# Patient Record
Sex: MALE | Race: WHITE | NOT HISPANIC OR LATINO | Employment: OTHER | URBAN - METROPOLITAN AREA
[De-identification: names, ages, dates, MRNs, and addresses within clinical notes are randomized per-mention and may not be internally consistent; named-entity substitution may affect disease eponyms.]

---

## 2017-05-08 PROBLEM — E11.29 CONTROLLED TYPE 2 DIABETES MELLITUS WITH MICROALBUMINURIA, WITHOUT LONG-TERM CURRENT USE OF INSULIN: Status: ACTIVE | Noted: 2017-05-08

## 2017-05-08 PROBLEM — R80.9 CONTROLLED TYPE 2 DIABETES MELLITUS WITH MICROALBUMINURIA, WITHOUT LONG-TERM CURRENT USE OF INSULIN: Status: ACTIVE | Noted: 2017-05-08

## 2017-09-11 PROBLEM — E78.2 MIXED HYPERLIPIDEMIA: Status: ACTIVE | Noted: 2017-09-11

## 2017-09-11 PROBLEM — E03.8 SUBCLINICAL HYPOTHYROIDISM: Status: ACTIVE | Noted: 2017-09-11

## 2017-09-27 ENCOUNTER — TELEPHONE (OUTPATIENT)
Dept: ADMINISTRATIVE | Facility: HOSPITAL | Age: 67
End: 2017-09-27

## 2017-10-03 PROBLEM — E11.9 TYPE 2 DIABETES MELLITUS WITHOUT OPHTHALMIC MANIFESTATIONS: Status: ACTIVE | Noted: 2017-10-03

## 2017-10-03 PROBLEM — H25.13 NUCLEAR SCLEROSIS, BILATERAL: Status: ACTIVE | Noted: 2017-10-03

## 2017-11-30 PROBLEM — Z86.010 HX OF COLONIC POLYPS: Status: ACTIVE | Noted: 2017-11-30

## 2018-09-11 PROBLEM — K45.8 RECURRENT ABDOMINAL HERNIA WITHOUT OBSTRUCTION OR GANGRENE: Status: ACTIVE | Noted: 2018-09-11

## 2018-10-11 ENCOUNTER — TELEPHONE (OUTPATIENT)
Dept: ADMINISTRATIVE | Facility: HOSPITAL | Age: 68
End: 2018-10-11

## 2019-02-19 ENCOUNTER — TELEPHONE (OUTPATIENT)
Dept: SMOKING CESSATION | Facility: CLINIC | Age: 69
End: 2019-02-19

## 2019-04-01 PROBLEM — R41.82 ALTERED MENTAL STATUS: Status: ACTIVE | Noted: 2019-04-01

## 2019-04-02 PROBLEM — J18.9 PNEUMONIA DUE TO INFECTIOUS ORGANISM: Status: ACTIVE | Noted: 2019-04-02

## 2019-04-02 PROBLEM — S12.110A: Status: ACTIVE | Noted: 2019-04-02

## 2019-04-03 ENCOUNTER — HOSPITAL ENCOUNTER (INPATIENT)
Facility: HOSPITAL | Age: 69
LOS: 2 days | Discharge: PSYCHIATRIC OR SUBSTANCE USE FACILITY | DRG: 885 | End: 2019-04-05
Attending: NEUROLOGICAL SURGERY | Admitting: NEUROLOGICAL SURGERY
Payer: MEDICARE

## 2019-04-03 DIAGNOSIS — J43.9 PULMONARY EMPHYSEMA, UNSPECIFIED EMPHYSEMA TYPE: ICD-10-CM

## 2019-04-03 DIAGNOSIS — Z91.89 AT RISK FOR PROLONGED QT INTERVAL SYNDROME: ICD-10-CM

## 2019-04-03 DIAGNOSIS — F29 PSYCHOSIS: ICD-10-CM

## 2019-04-03 DIAGNOSIS — J43.1 PANLOBULAR EMPHYSEMA: ICD-10-CM

## 2019-04-03 DIAGNOSIS — S12.112A CLOSED NONDISPLACED ODONTOID FRACTURE WITH TYPE II MORPHOLOGY, INITIAL ENCOUNTER: ICD-10-CM

## 2019-04-03 DIAGNOSIS — I10 ESSENTIAL HYPERTENSION: ICD-10-CM

## 2019-04-03 DIAGNOSIS — R80.9 CONTROLLED TYPE 2 DIABETES MELLITUS WITH MICROALBUMINURIA, WITHOUT LONG-TERM CURRENT USE OF INSULIN: ICD-10-CM

## 2019-04-03 DIAGNOSIS — E11.9 TYPE 2 DIABETES MELLITUS WITHOUT COMPLICATION, WITHOUT LONG-TERM CURRENT USE OF INSULIN: ICD-10-CM

## 2019-04-03 DIAGNOSIS — Z72.0 TOBACCO ABUSE: ICD-10-CM

## 2019-04-03 DIAGNOSIS — E11.29 CONTROLLED TYPE 2 DIABETES MELLITUS WITH MICROALBUMINURIA, WITHOUT LONG-TERM CURRENT USE OF INSULIN: ICD-10-CM

## 2019-04-03 DIAGNOSIS — S12.110A: ICD-10-CM

## 2019-04-03 DIAGNOSIS — F24 SHARED PSYCHOTIC DISORDER: ICD-10-CM

## 2019-04-03 LAB
APTT BLDCRRT: 27.7 SEC (ref 21–32)
BACTERIA #/AREA URNS AUTO: ABNORMAL /HPF
BILIRUB UR QL STRIP: NEGATIVE
CLARITY UR REFRACT.AUTO: CLEAR
COLOR UR AUTO: YELLOW
GLUCOSE UR QL STRIP: NEGATIVE
HGB UR QL STRIP: ABNORMAL
INR PPP: 0.9 (ref 0.8–1.2)
KETONES UR QL STRIP: NEGATIVE
LEUKOCYTE ESTERASE UR QL STRIP: NEGATIVE
MICROSCOPIC COMMENT: ABNORMAL
NITRITE UR QL STRIP: NEGATIVE
PH UR STRIP: >8 [PH] (ref 5–8)
POCT GLUCOSE: 134 MG/DL (ref 70–110)
POCT GLUCOSE: 196 MG/DL (ref 70–110)
POCT GLUCOSE: 223 MG/DL (ref 70–110)
PROT UR QL STRIP: NEGATIVE
PROTHROMBIN TIME: 9.6 SEC (ref 9–12.5)
RBC #/AREA URNS AUTO: 5 /HPF (ref 0–4)
SP GR UR STRIP: 1.01 (ref 1–1.03)
SQUAMOUS #/AREA URNS AUTO: 0 /HPF
URN SPEC COLLECT METH UR: ABNORMAL
WBC #/AREA URNS AUTO: 1 /HPF (ref 0–5)

## 2019-04-03 PROCEDURE — 20600001 HC STEP DOWN PRIVATE ROOM

## 2019-04-03 PROCEDURE — S4991 NICOTINE PATCH NONLEGEND: HCPCS | Performed by: PHYSICIAN ASSISTANT

## 2019-04-03 PROCEDURE — 85610 PROTHROMBIN TIME: CPT

## 2019-04-03 PROCEDURE — 85730 THROMBOPLASTIN TIME PARTIAL: CPT

## 2019-04-03 PROCEDURE — 99222 1ST HOSP IP/OBS MODERATE 55: CPT | Mod: ,,, | Performed by: PSYCHIATRY & NEUROLOGY

## 2019-04-03 PROCEDURE — 36415 COLL VENOUS BLD VENIPUNCTURE: CPT

## 2019-04-03 PROCEDURE — 94799 UNLISTED PULMONARY SVC/PX: CPT

## 2019-04-03 PROCEDURE — 63600175 PHARM REV CODE 636 W HCPCS: Performed by: PHYSICIAN ASSISTANT

## 2019-04-03 PROCEDURE — 25000003 PHARM REV CODE 250: Performed by: PSYCHIATRY & NEUROLOGY

## 2019-04-03 PROCEDURE — 25000003 PHARM REV CODE 250: Performed by: PHYSICIAN ASSISTANT

## 2019-04-03 PROCEDURE — 81001 URINALYSIS AUTO W/SCOPE: CPT

## 2019-04-03 PROCEDURE — 99222 PR INITIAL HOSPITAL CARE,LEVL II: ICD-10-PCS | Mod: ,,, | Performed by: PSYCHIATRY & NEUROLOGY

## 2019-04-03 RX ORDER — QUINAPRIL 40 MG/1
40 TABLET ORAL DAILY
Status: DISCONTINUED | OUTPATIENT
Start: 2019-04-03 | End: 2019-04-05 | Stop reason: HOSPADM

## 2019-04-03 RX ORDER — AMLODIPINE BESYLATE 10 MG/1
10 TABLET ORAL DAILY
Status: DISCONTINUED | OUTPATIENT
Start: 2019-04-03 | End: 2019-04-05 | Stop reason: HOSPADM

## 2019-04-03 RX ORDER — IBUPROFEN 200 MG
16 TABLET ORAL
Status: DISCONTINUED | OUTPATIENT
Start: 2019-04-03 | End: 2019-04-05 | Stop reason: HOSPADM

## 2019-04-03 RX ORDER — RISPERIDONE 1 MG/1
1 TABLET ORAL 2 TIMES DAILY
Status: DISCONTINUED | OUTPATIENT
Start: 2019-04-03 | End: 2019-04-04

## 2019-04-03 RX ORDER — AMITRIPTYLINE HYDROCHLORIDE 25 MG/1
25 TABLET, FILM COATED ORAL NIGHTLY PRN
Status: DISCONTINUED | OUTPATIENT
Start: 2019-04-03 | End: 2019-04-05 | Stop reason: HOSPADM

## 2019-04-03 RX ORDER — AMOXICILLIN 250 MG
1 CAPSULE ORAL 2 TIMES DAILY
Status: DISCONTINUED | OUTPATIENT
Start: 2019-04-03 | End: 2019-04-05 | Stop reason: HOSPADM

## 2019-04-03 RX ORDER — OLANZAPINE 2.5 MG/1
5 TABLET ORAL EVERY 6 HOURS PRN
Status: DISCONTINUED | OUTPATIENT
Start: 2019-04-03 | End: 2019-04-05 | Stop reason: HOSPADM

## 2019-04-03 RX ORDER — AMITRIPTYLINE HYDROCHLORIDE 50 MG/1
100 TABLET, FILM COATED ORAL NIGHTLY PRN
Status: DISCONTINUED | OUTPATIENT
Start: 2019-04-03 | End: 2019-04-03

## 2019-04-03 RX ORDER — INSULIN ASPART 100 [IU]/ML
1-10 INJECTION, SOLUTION INTRAVENOUS; SUBCUTANEOUS
Status: DISCONTINUED | OUTPATIENT
Start: 2019-04-03 | End: 2019-04-05 | Stop reason: HOSPADM

## 2019-04-03 RX ORDER — IBUPROFEN 200 MG
1 TABLET ORAL DAILY
Status: DISCONTINUED | OUTPATIENT
Start: 2019-04-03 | End: 2019-04-05 | Stop reason: HOSPADM

## 2019-04-03 RX ORDER — ATORVASTATIN CALCIUM 20 MG/1
40 TABLET, FILM COATED ORAL DAILY
Status: DISCONTINUED | OUTPATIENT
Start: 2019-04-03 | End: 2019-04-05 | Stop reason: HOSPADM

## 2019-04-03 RX ORDER — OLANZAPINE 10 MG/2ML
5 INJECTION, POWDER, FOR SOLUTION INTRAMUSCULAR EVERY 6 HOURS PRN
Status: DISCONTINUED | OUTPATIENT
Start: 2019-04-03 | End: 2019-04-05 | Stop reason: HOSPADM

## 2019-04-03 RX ORDER — CYCLOBENZAPRINE HCL 5 MG
10 TABLET ORAL 3 TIMES DAILY PRN
Status: DISCONTINUED | OUTPATIENT
Start: 2019-04-03 | End: 2019-04-03

## 2019-04-03 RX ORDER — HYDROCODONE BITARTRATE AND ACETAMINOPHEN 10; 325 MG/1; MG/1
1 TABLET ORAL EVERY 4 HOURS PRN
Status: DISCONTINUED | OUTPATIENT
Start: 2019-04-03 | End: 2019-04-03

## 2019-04-03 RX ORDER — IBUPROFEN 200 MG
24 TABLET ORAL
Status: DISCONTINUED | OUTPATIENT
Start: 2019-04-03 | End: 2019-04-05 | Stop reason: HOSPADM

## 2019-04-03 RX ORDER — DIVALPROEX SODIUM 250 MG/1
250 TABLET, DELAYED RELEASE ORAL 2 TIMES DAILY
Status: DISCONTINUED | OUTPATIENT
Start: 2019-04-03 | End: 2019-04-05 | Stop reason: HOSPADM

## 2019-04-03 RX ORDER — GLUCAGON 1 MG
1 KIT INJECTION
Status: DISCONTINUED | OUTPATIENT
Start: 2019-04-03 | End: 2019-04-05 | Stop reason: HOSPADM

## 2019-04-03 RX ORDER — POLYETHYLENE GLYCOL 3350 17 G/17G
17 POWDER, FOR SOLUTION ORAL DAILY
Status: DISCONTINUED | OUTPATIENT
Start: 2019-04-03 | End: 2019-04-05 | Stop reason: HOSPADM

## 2019-04-03 RX ORDER — AMITRIPTYLINE HYDROCHLORIDE 25 MG/1
25 TABLET, FILM COATED ORAL NIGHTLY
Status: DISCONTINUED | OUTPATIENT
Start: 2019-04-03 | End: 2019-04-04

## 2019-04-03 RX ORDER — HYDROCODONE BITARTRATE AND ACETAMINOPHEN 5; 325 MG/1; MG/1
1 TABLET ORAL EVERY 6 HOURS PRN
Status: DISCONTINUED | OUTPATIENT
Start: 2019-04-03 | End: 2019-04-05 | Stop reason: HOSPADM

## 2019-04-03 RX ORDER — ACETAMINOPHEN 325 MG/1
650 TABLET ORAL EVERY 6 HOURS PRN
Status: DISCONTINUED | OUTPATIENT
Start: 2019-04-03 | End: 2019-04-05 | Stop reason: HOSPADM

## 2019-04-03 RX ORDER — BUPROPION HYDROCHLORIDE 300 MG/1
300 TABLET ORAL DAILY
Status: DISCONTINUED | OUTPATIENT
Start: 2019-04-03 | End: 2019-04-03

## 2019-04-03 RX ORDER — CYCLOBENZAPRINE HCL 5 MG
5 TABLET ORAL 3 TIMES DAILY PRN
Status: DISCONTINUED | OUTPATIENT
Start: 2019-04-03 | End: 2019-04-05 | Stop reason: HOSPADM

## 2019-04-03 RX ORDER — ALBUTEROL SULFATE 90 UG/1
2 AEROSOL, METERED RESPIRATORY (INHALATION) EVERY 6 HOURS PRN
Status: DISCONTINUED | OUTPATIENT
Start: 2019-04-03 | End: 2019-04-05 | Stop reason: HOSPADM

## 2019-04-03 RX ORDER — CARVEDILOL 25 MG/1
25 TABLET ORAL 2 TIMES DAILY
Status: DISCONTINUED | OUTPATIENT
Start: 2019-04-03 | End: 2019-04-05 | Stop reason: HOSPADM

## 2019-04-03 RX ADMIN — NICOTINE 1 PATCH: 21 PATCH, EXTENDED RELEASE TRANSDERMAL at 11:04

## 2019-04-03 RX ADMIN — STANDARDIZED SENNA CONCENTRATE AND DOCUSATE SODIUM 1 TABLET: 8.6; 5 TABLET, FILM COATED ORAL at 09:04

## 2019-04-03 RX ADMIN — STANDARDIZED SENNA CONCENTRATE AND DOCUSATE SODIUM 1 TABLET: 8.6; 5 TABLET, FILM COATED ORAL at 11:04

## 2019-04-03 RX ADMIN — POLYETHYLENE GLYCOL 3350 17 G: 17 POWDER, FOR SOLUTION ORAL at 11:04

## 2019-04-03 RX ADMIN — CYCLOBENZAPRINE HYDROCHLORIDE 5 MG: 5 TABLET, FILM COATED ORAL at 09:04

## 2019-04-03 RX ADMIN — DIVALPROEX SODIUM 250 MG: 250 TABLET, DELAYED RELEASE ORAL at 11:04

## 2019-04-03 RX ADMIN — QUINAPRIL 40 MG: 40 TABLET ORAL at 01:04

## 2019-04-03 RX ADMIN — RISPERIDONE 1 MG: 1 TABLET ORAL at 11:04

## 2019-04-03 RX ADMIN — HYDROCODONE BITARTRATE AND ACETAMINOPHEN 1 TABLET: 5; 325 TABLET ORAL at 11:04

## 2019-04-03 RX ADMIN — HYDROCODONE BITARTRATE AND ACETAMINOPHEN 1 TABLET: 5; 325 TABLET ORAL at 09:04

## 2019-04-03 RX ADMIN — AMITRIPTYLINE HYDROCHLORIDE 25 MG: 25 TABLET, FILM COATED ORAL at 09:04

## 2019-04-03 RX ADMIN — CARVEDILOL 25 MG: 25 TABLET, FILM COATED ORAL at 11:04

## 2019-04-03 RX ADMIN — ATORVASTATIN CALCIUM 40 MG: 20 TABLET, FILM COATED ORAL at 11:04

## 2019-04-03 RX ADMIN — BUPROPION HYDROCHLORIDE 300 MG: 300 TABLET, FILM COATED, EXTENDED RELEASE ORAL at 11:04

## 2019-04-03 RX ADMIN — RISPERIDONE 1 MG: 1 TABLET ORAL at 09:04

## 2019-04-03 RX ADMIN — CARVEDILOL 25 MG: 25 TABLET, FILM COATED ORAL at 09:04

## 2019-04-03 RX ADMIN — AMLODIPINE BESYLATE 10 MG: 10 TABLET ORAL at 11:04

## 2019-04-03 RX ADMIN — INSULIN ASPART 4 UNITS: 100 INJECTION, SOLUTION INTRAVENOUS; SUBCUTANEOUS at 05:04

## 2019-04-03 RX ADMIN — DIVALPROEX SODIUM 250 MG: 250 TABLET, DELAYED RELEASE ORAL at 09:04
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
## 2019-04-03 NOTE — ASSESSMENT & PLAN NOTE
69 year old male with a PMHx of BCC, depression, T2DM, GERD, HTN, and HLD, who originally presented to the [facility] ED due to altered mental status. While in the ED, he complained of acute onset of neck pain. CT cervical spine showed a nondisplaced type 2 dens fracture.     -Patient neurologically stable on exam  -Vista collar ordered from [facility]  -Collar to be worn at all times  -Psychiatry consulted to determine if PEC needs to be placed and to evaluate medications  -Will hold  mg in case surgical intervention is required  -Agitation/Hallucinations: Psyc at [facility] hospital recommended starting Risperdal 1 mg BID. Will start this medication. Also recommended decreasing Elavil to 25 mg qHS PRN.   -Depression: Restart home dose of Bupropion.   -HTN: Restart home dose of Carvedilol, Amlodipine, and Quinapril.   -T2DM: Diabetic diet, ISS, POCT x 4  -HLD: Restart home dose of Atorvastatin  -Emphysema: Restart home dose of Albuterol PRN wheezing  -Tobacco abuse: Nicotine patch daily  -DVT prophylaxis: MAIDA's, SCD's  -Bowel regimen: senna and miralax daily  -Atelectasis prevention: IS hourly while awake  -Further treatment recs pending psyc eval

## 2019-04-03 NOTE — SUBJECTIVE & OBJECTIVE
Medications:  Continuous Infusions:  Scheduled Meds:   amLODIPine  10 mg Oral Daily    atorvastatin  40 mg Oral Daily    buPROPion  300 mg Oral Daily    carvedilol  25 mg Oral BID    divalproex  250 mg Oral BID    nicotine  1 patch Transdermal Daily    polyethylene glycol  17 g Oral Daily    quinapril  40 mg Oral Daily    risperiDONE  1 mg Oral BID    senna-docusate 8.6-50 mg  1 tablet Oral BID     PRN Meds:acetaminophen, albuterol, amitriptyline, cyclobenzaprine, dextrose 50%, dextrose 50%, glucagon (human recombinant), glucose, glucose, glucose, HYDROcodone-acetaminophen, insulin aspart U-100     Review of Systems   Constitutional: no fever, chills or night sweats. No changes in weight   Eyes: no visual changes   ENT: no nasal congestion or sore throat   Respiratory: Positive for intermittent shortness of breath   Cardiovascular: no chest pain or palpitations   Gastrointestinal: no nausea or vomiting   Genitourinary: no hematuria or dysuria   Integument/Breast: no rash or pruritis   Hematologic/Lymphatic: no easy bruising or lymphadenopathy   Musculoskeletal: Positive for neck pain  Neurological: Positive for intermittent headaches and intermittently dropping items from hands.   Behavioral/Psych: no auditory or visual hallucinations   Endocrine: no heat or cold intolerance     Objective:        There is no height or weight on file to calculate BMI.  Vital Signs (Most Recent):  Temp: 98.7 °F (37.1 °C) (04/03/19 1049)  Pulse: 85 (04/03/19 1049)  Resp: 16 (04/03/19 1049)  BP: (!) 143/85 (04/03/19 1049)  SpO2: 96 % (04/03/19 1049) Vital Signs (24h Range):  Temp:  [96.2 °F (35.7 °C)-98.7 °F (37.1 °C)] 98.7 °F (37.1 °C)  Pulse:  [81-99] 85  Resp:  [16-20] 16  SpO2:  [96 %-98 %] 96 %  BP: (119-157)/(76-98) 143/85       Neurosurgery Physical Exam   General: well developed, well nourished, appears older than stated age, poor hygiene, no distress.   Head: normocephalic, small scalp hematoma to right temporal  area  Neurologic: Alert, awake, interactive. Tangential speech.   GCS: Motor: 6/Verbal: 4/Eyes: 4 GCS Total: 14  Language: No aphasia  Speech: No dysarthria  Cranial nerves: face symmetric, tongue midline, CN II-XII grossly intact.   Eyes: pupils equal, round, reactive to light with accomodation, EOMI.   Pulmonary: normal respirations, no signs of respiratory distress  Abdomen: soft, non-distended, not tender to palpation  Skin: Skin is warm, dry and intact.  Sensory: intact to light touch throughout  Motor Strength: Moves all extremities spontaneously with good tone.  Full strength upper and lower extremities. No abnormal movements seen.   Squires's: Negative.  Clonus: Negative.  Finger-to-nose normal.   Pronator drift: absent bilaterally  Midline TTP: Negative.         Significant Labs:  Recent Labs   Lab 04/01/19  1638 04/02/19  0506 04/03/19  0509    87 97   * 134* 134*   K 4.2 3.2* 3.6   CL 93* 98 97   CO2 27 26 27   BUN 20 12 8   CREATININE 0.8 0.6 0.6   CALCIUM 10.4 9.1 9.3   MG  --  1.4* 1.8     Recent Labs   Lab 04/01/19  1638 04/02/19  0506 04/03/19  0509   WBC 17.94* 13.81* 10.14   HGB 15.2 13.4* 13.4*   HCT 45.2 40.1 39.6*    271 258     Recent Labs   Lab 04/02/19  1015   INR 1.2         Significant Diagnostics:  CT cervical spine:  I independently reviewed the imaging.     Nondisplaced fracture base of dens (type 2 dens fracture) with well-marginated rounded area of lucency at the posterior aspect base of dens and as such possibility of pathologic fracture versus traumatic fracture is raised.  Prominence of adjacent prevertebral soft tissues noted.    No other acute fracture appreciated remaining cervical levels.    Changes of anterior interbody fusion C3-4.    Disc and/or facet degenerative changes are present throughout the cervical spine as detailed for each level above.

## 2019-04-03 NOTE — ASSESSMENT & PLAN NOTE
ASSESSMENT     Conrad Wilcox is a 69 y.o. male with a past psychiatric history of depression, who presented to the [facility] due to cervical fracture.    On psychiatric interview, patient is initially linear, but often goes into tangents and his thought content is bizarre (I.e., getting into an altercation with his cat due to cat pushing his Norco away from him, VH of a black screen and someone dressed in green). His speech is rapid and garbled but he is redirectable. He denies SI and HI but endorses AH (which last told him to stay calm) and the aforementioned VH. His only previous psych dx is depression, but patient currently does not appear objectively depressed nor does he report any SIGECAPS sx other than low energy level. UTox + for opiates and PCP, so PCP induced psychosis high on differential. Collateral is essential to obtaining an idea of patient's baseline, as well as background, and will continue to attempt to obtain collateral.    IMPRESSION  Psychosis, unspecified  R/o Substance Induced Psychosis (likely 2/2 PCP)    RECOMMENDATION(S)      1. Scheduled Medication(s):  Risperdal 1mg PO BID  Depakote 250mg PO BID  Elavil 25mg PO QHS   (discontinue due to dopaminergic effects)     2. PRN Medication(s):  Zyprexa 5mg PO/IM Q6H PRN for non-redirectable agitation    3.  Monitor:  Please obtain daily EKG to monitor QTc    4. Legal Status/Precaution(s):  Recommend PEC/CEC as patient is gravely disabled due to a psychiatric illness.  Recommend sitter at bedside, as patient was attempting to stand up after interview, and he is a fall risk.    5. Capacity:  Pt continues to have waxing and waning of symptoms. Therefore pt currently does NOT have medical decision making capacity due to current mental status. Please contact next of kin for making medical decisions currently.
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
## 2019-04-04 LAB
ANION GAP SERPL CALC-SCNC: 9 MMOL/L (ref 8–16)
BASOPHILS # BLD AUTO: 0.04 K/UL (ref 0–0.2)
BASOPHILS NFR BLD: 0.5 % (ref 0–1.9)
BUN SERPL-MCNC: 13 MG/DL (ref 8–23)
CALCIUM SERPL-MCNC: 8.5 MG/DL (ref 8.7–10.5)
CHLORIDE SERPL-SCNC: 98 MMOL/L (ref 95–110)
CO2 SERPL-SCNC: 26 MMOL/L (ref 23–29)
CREAT SERPL-MCNC: 0.6 MG/DL (ref 0.5–1.4)
DIFFERENTIAL METHOD: ABNORMAL
EOSINOPHIL # BLD AUTO: 0.2 K/UL (ref 0–0.5)
EOSINOPHIL NFR BLD: 1.9 % (ref 0–8)
ERYTHROCYTE [DISTWIDTH] IN BLOOD BY AUTOMATED COUNT: 15.7 % (ref 11.5–14.5)
EST. GFR  (AFRICAN AMERICAN): >60 ML/MIN/1.73 M^2
EST. GFR  (NON AFRICAN AMERICAN): >60 ML/MIN/1.73 M^2
GLUCOSE SERPL-MCNC: 99 MG/DL (ref 70–110)
HCT VFR BLD AUTO: 33.9 % (ref 40–54)
HGB BLD-MCNC: 11.5 G/DL (ref 14–18)
IMM GRANULOCYTES # BLD AUTO: 0.02 K/UL (ref 0–0.04)
IMM GRANULOCYTES NFR BLD AUTO: 0.3 % (ref 0–0.5)
LYMPHOCYTES # BLD AUTO: 1.9 K/UL (ref 1–4.8)
LYMPHOCYTES NFR BLD: 23.8 % (ref 18–48)
MCH RBC QN AUTO: 29.5 PG (ref 27–31)
MCHC RBC AUTO-ENTMCNC: 33.9 G/DL (ref 32–36)
MCV RBC AUTO: 87 FL (ref 82–98)
MONOCYTES # BLD AUTO: 1.2 K/UL (ref 0.3–1)
MONOCYTES NFR BLD: 15.6 % (ref 4–15)
NEUTROPHILS # BLD AUTO: 4.5 K/UL (ref 1.8–7.7)
NEUTROPHILS NFR BLD: 57.9 % (ref 38–73)
NRBC BLD-RTO: 0 /100 WBC
PLATELET # BLD AUTO: 286 K/UL (ref 150–350)
PMV BLD AUTO: 9.5 FL (ref 9.2–12.9)
POCT GLUCOSE: 105 MG/DL (ref 70–110)
POCT GLUCOSE: 115 MG/DL (ref 70–110)
POCT GLUCOSE: 147 MG/DL (ref 70–110)
POTASSIUM SERPL-SCNC: 3.9 MMOL/L (ref 3.5–5.1)
RBC # BLD AUTO: 3.9 M/UL (ref 4.6–6.2)
SODIUM SERPL-SCNC: 133 MMOL/L (ref 136–145)
WBC # BLD AUTO: 7.82 K/UL (ref 3.9–12.7)

## 2019-04-04 PROCEDURE — 25000003 PHARM REV CODE 250: Performed by: PSYCHIATRY & NEUROLOGY

## 2019-04-04 PROCEDURE — 99232 PR SUBSEQUENT HOSPITAL CARE,LEVL II: ICD-10-PCS | Mod: ,,, | Performed by: PHYSICIAN ASSISTANT

## 2019-04-04 PROCEDURE — 99232 PR SUBSEQUENT HOSPITAL CARE,LEVL II: ICD-10-PCS | Mod: ,,, | Performed by: PSYCHIATRY & NEUROLOGY

## 2019-04-04 PROCEDURE — 93005 ELECTROCARDIOGRAM TRACING: CPT

## 2019-04-04 PROCEDURE — 93010 ELECTROCARDIOGRAM REPORT: CPT | Mod: ,,, | Performed by: INTERNAL MEDICINE

## 2019-04-04 PROCEDURE — 25000003 PHARM REV CODE 250: Performed by: PHYSICIAN ASSISTANT

## 2019-04-04 PROCEDURE — 99232 SBSQ HOSP IP/OBS MODERATE 35: CPT | Mod: ,,, | Performed by: PHYSICIAN ASSISTANT

## 2019-04-04 PROCEDURE — 36415 COLL VENOUS BLD VENIPUNCTURE: CPT

## 2019-04-04 PROCEDURE — 20600001 HC STEP DOWN PRIVATE ROOM

## 2019-04-04 PROCEDURE — S4991 NICOTINE PATCH NONLEGEND: HCPCS | Performed by: PHYSICIAN ASSISTANT

## 2019-04-04 PROCEDURE — 85025 COMPLETE CBC W/AUTO DIFF WBC: CPT

## 2019-04-04 PROCEDURE — 93010 EKG 12-LEAD: ICD-10-PCS | Mod: ,,, | Performed by: INTERNAL MEDICINE

## 2019-04-04 PROCEDURE — 99232 SBSQ HOSP IP/OBS MODERATE 35: CPT | Mod: ,,, | Performed by: PSYCHIATRY & NEUROLOGY

## 2019-04-04 PROCEDURE — 80048 BASIC METABOLIC PNL TOTAL CA: CPT

## 2019-04-04 RX ORDER — RISPERIDONE 1 MG/1
2 TABLET ORAL NIGHTLY
Status: DISCONTINUED | OUTPATIENT
Start: 2019-04-04 | End: 2019-04-05 | Stop reason: HOSPADM

## 2019-04-04 RX ORDER — RISPERIDONE 1 MG/1
1 TABLET ORAL DAILY
Status: DISCONTINUED | OUTPATIENT
Start: 2019-04-05 | End: 2019-04-05 | Stop reason: HOSPADM

## 2019-04-04 RX ADMIN — RISPERIDONE 1 MG: 1 TABLET ORAL at 08:04

## 2019-04-04 RX ADMIN — CYCLOBENZAPRINE HYDROCHLORIDE 5 MG: 5 TABLET, FILM COATED ORAL at 08:04

## 2019-04-04 RX ADMIN — CARVEDILOL 25 MG: 25 TABLET, FILM COATED ORAL at 08:04

## 2019-04-04 RX ADMIN — ATORVASTATIN CALCIUM 40 MG: 20 TABLET, FILM COATED ORAL at 08:04

## 2019-04-04 RX ADMIN — HYDROCODONE BITARTRATE AND ACETAMINOPHEN 1 TABLET: 5; 325 TABLET ORAL at 06:04

## 2019-04-04 RX ADMIN — POLYETHYLENE GLYCOL 3350 17 G: 17 POWDER, FOR SOLUTION ORAL at 08:04

## 2019-04-04 RX ADMIN — STANDARDIZED SENNA CONCENTRATE AND DOCUSATE SODIUM 1 TABLET: 8.6; 5 TABLET, FILM COATED ORAL at 08:04

## 2019-04-04 RX ADMIN — SODIUM CHLORIDE TAB 1 GM 1 G: 1 TAB at 08:04

## 2019-04-04 RX ADMIN — DIVALPROEX SODIUM 250 MG: 250 TABLET, DELAYED RELEASE ORAL at 08:04

## 2019-04-04 RX ADMIN — NICOTINE 1 PATCH: 21 PATCH, EXTENDED RELEASE TRANSDERMAL at 08:04

## 2019-04-04 RX ADMIN — RISPERIDONE 2 MG: 1 TABLET ORAL at 08:04

## 2019-04-04 RX ADMIN — HYDROCODONE BITARTRATE AND ACETAMINOPHEN 1 TABLET: 5; 325 TABLET ORAL at 09:04

## 2019-04-04 RX ADMIN — SODIUM CHLORIDE TAB 1 GM 1 G: 1 TAB at 09:04

## 2019-04-04 RX ADMIN — QUINAPRIL 40 MG: 40 TABLET ORAL at 08:04

## 2019-04-04 RX ADMIN — HYDROCODONE BITARTRATE AND ACETAMINOPHEN 1 TABLET: 5; 325 TABLET ORAL at 02:04
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
## 2019-04-04 NOTE — PLAN OF CARE
CM met with patient to obtain discharge planning assessment.  Patient has sitter at bedside as patient is PEC'D.  Patient able to state name, address and states who his roommate is.  Patient has garbled speech and does not appear to be clear in thought.  Planned discharge is home with family - Plan (A) or home with family and home health - Plan (B).    PCP:  Jerman Franco MD     Payor:  Payor: MEDICARE / Plan: MEDICARE PART A & B / Product Type: Government /      Pharmacy:    [facility]  [place], [place] - [address]  [address] [place] [address]  Phone: [phone] Fax: [phone]    [facility] [address] [facility] [place]  [address] AT [facility] [address]  [address]  [place] [address]  Phone: [phone] Fax: [phone]       04/04/19 1423   Discharge Assessment   Assessment Type Discharge Planning Assessment   Confirmed/corrected address and phone number on facesheet? Yes   Assessment information obtained from? Patient   Communicated expected length of stay with patient/caregiver no   Prior to hospitilization cognitive status: Unable to Assess   Prior to hospitalization functional status: Independent   Current cognitive status: Not Oriented to Place;Not Oriented to Time   Current Functional Status: Needs Assistance   Facility [facility]rived From: [facility]   Lives With friend(s)   Able to Return to Prior Arrangements other (see comments)  (unknown at this time.)   Is patient able to care for self after discharge? Unable to determine at this time (comments)   Who are your caregiver(s) and their phone number(s)? Parminder Walker - friend [phone]   Patient's perception of discharge disposition home or selfcare   Readmission Within the Last 30 Days no previous admission in last 30 days   Patient currently being followed by outpatient case management? No   Patient currently receives any other outside agency services? No   Equipment Currently Used at Home cane,  straight   Do you have any problems affording any of your prescribed medications? TBD   Does the patient have transportation home? Yes   Does the patient receive services at the [facility]? No   Discharge Plan A Home with family   Discharge Plan B Home with family;Home Health   DME Needed Upon Discharge  none   Patient/Family in Agreement with Plan unable to assess

## 2019-04-04 NOTE — HOSPITAL COURSE
4/3: Admit to NSGY service for surgical planning. Psychiatry consulted to determine if PEC necessary and for medication adjustments.   4/4: Patient PEC'ed yesterday. Psychiatric medications adjusted. Surgical intervention placed on hold due to patient's inability to consent for surgery.   4/5: NAEON. Pain controlled. Vitals stable. Active PEC. Surgical intervention on hold. DC to [facility] inpatient Psychiatric unit.

## 2019-04-04 NOTE — ASSESSMENT & PLAN NOTE
ASSESSMENT     Conrad Wilcox is a 69 y.o. male with a past psychiatric history of depression, who [place] [facility] [place]. On initial psychiatric interview, patient is initially linear, but often goes into tangents and his thought content is bizarre (I.e., getting into an altercation with his cat due to cat pushing his Norco away from him, VH of a black screen and someone dressed in green). His speech is rapid and garbled but he is redirectable. He denies SI and HI but endorses AH (which last told him to stay calm) and the aforementioned VH. His only previous psych dx is depression, but patient currently does not appear objectively depressed nor does he report any SIGECAPS sx other than low energy level. UTox + for opiates and PCP, so PCP induced psychosis high on differential. Collateral is essential to obtaining an idea of patient's baseline, as well as background, and will continue to attempt to obtain collateral.    Today, patient's continues to demonstrate disorganized thought process. Interview concerning for residual paranoia and impaired insight. Tolerating psych meds without issues, and no psych PRNs required in past 24 hours. Team is still unable to reach collateral today.    IMPRESSION  Psychosis, unspecified  R/o Substance Induced Psychosis (likely 2/2 PCP)    RECOMMENDATION(S)      1. Scheduled Medication(s):  Increase Risperdal to 1mg qAM, 2mg qHS  Depakote 250mg PO BID   (discontinue on 4/4)   (discontinue due to dopaminergic effects)     2. PRN Medication(s):  Zyprexa 5mg PO/IM Q6H PRN for non-redirectable agitation    3.  Monitor:  Please obtain daily EKG to monitor QTc: most recent 437 on 4/4    4. Legal Status/Precaution(s):  Continue PEC (exp 4/6) as patient is gravely disabled due to a psychiatric illness.  Recommend sitter at bedside, as patient was attempting to stand up after initial interview, and he is a fall risk.    5. Capacity:  Pt continues to have waxing and  waning of symptoms. Therefore pt currently does NOT have medical decision making capacity due to current mental status. Please contact next of kin for making medical decisions currently.
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
## 2019-04-04 NOTE — ASSESSMENT & PLAN NOTE
69 year old male with a PMHx of BCC, depression, T2DM, GERD, HTN, and HLD, who originally presented to the [facility] ED due to altered mental status. While in the ED, he complained of acute onset of neck pain. CT cervical spine showed a nondisplaced type 2 dens fracture.     -Patient neurologically stable on exam  -Continue collar to be worn at all times  -Surgery on hold due to PEC. Continue to hold  mg in preparation for surgery in the future.    -Agitation/Hallucinations: Continue PEC and bedside sitter at all times. Continue Risperdal 1 mg qAM and 1 mg qHS. Continue Depakote 250 mg BID and Zyprexa PRN. At risk for prolonged QT interval. Continue daily EKG to monitor QTc. Patient medically stable, no current need for a medicine consult.  Would appreciate transfer to [facility] service for inpatient psychiatric care until PEC can be lifted and surgery can be performed.     -HTN: Continue home dose of Carvedilol, Amlodipine, and Quinapril.   -T2DM: Diabetic diet, ISS, POCT x 4  -HLD: Continue home dose of Atorvastatin  -Emphysema: Continue home dose of Albuterol PRN wheezing  -Tobacco abuse: Continue Nicotine patch daily  -DVT prophylaxis: MAIDA's, SCD's  -Bowel regimen: senna and miralax daily  -Atelectasis prevention: IS hourly while awake      DISPO: Unable to discharge due to active PEC

## 2019-04-04 NOTE — HOSPITAL COURSE
"  4/4/2019   Chart reviewed. Patient has been medication compliant. Received no psychiatric PRNs in the past 24 hours. Patient calm and cooperative with interview with persistent garbled speech and disorganized thought process. Mood is "fine but I don't know why I'm here". When asked about AH, he states that he has heard voices for years and he last heard them two days ago and they were "cheerful". When asked about VH, he goes into a tangent about trees he sees in the mirror (reflection of trees outside the window). Vague paranoia. Denies SI, HI. Reports sleeping and eating well, but the sitter at bedside reports that the patient did not sleep at all. No somatic complaints, no other complaints during interview. No side effects noted with Risperdal, Elavil, and Depakote.     4/5/2019   Chart reviewed. CEC'd yesterday at 10:05am. Patient has been medication compliant. Received no psychiatric PRNs in the past 24 hours. Patient calm and cooperative with interview. Speech is garbled and rapid. Remains disorganized, and describes his mood as "everyone is lighthearted around here". Denies SI and HI. Provides a vague response when asked about AH. When asked about VH, he reports seeing an "old puffy man with glasses" earlier and points at the corner of the room. Goes into a tangent about how the knot on his head makes his dyslexia worse. Reports not sleeping well last night, and sitter reports that patient likely slept only 15 minutes last night. C/o neck pain.   "

## 2019-04-04 NOTE — SUBJECTIVE & OBJECTIVE
Interval History:   NAEON. Patient resting comfortably in bed. Sitter at bedside. Patient continues to report neck pain. Difficult to understand other statements due to repetitive, tangential speech. Tolerating diet. Voiding appropriately.     Medications:  Continuous Infusions:  Scheduled Meds:   amLODIPine  10 mg Oral Daily    atorvastatin  40 mg Oral Daily    carvedilol  25 mg Oral BID    divalproex  250 mg Oral BID    nicotine  1 patch Transdermal Daily    polyethylene glycol  17 g Oral Daily    quinapril  40 mg Oral Daily    [START ON 4/5/2019] risperiDONE  1 mg Oral Daily    risperiDONE  2 mg Oral QHS    senna-docusate 8.6-50 mg  1 tablet Oral BID    sodium chloride  1 g Oral BID     PRN Meds:acetaminophen, albuterol, amitriptyline, cyclobenzaprine, dextrose 50%, dextrose 50%, glucagon (human recombinant), glucose, glucose, glucose, HYDROcodone-acetaminophen, insulin aspart U-100, OLANZapine, OLANZapine     Review of Systems  Objective:     Weight: 74.8 kg (165 lb)  Body mass index is 23.68 kg/m².  Vital Signs (Most Recent):  Temp: 97.6 °F (36.4 °C) (04/04/19 0755)  Pulse: 70 (04/04/19 1222)  Resp: 18 (04/04/19 1222)  BP: 105/61 (04/04/19 1222)  SpO2: 96 % (04/04/19 1222) Vital Signs (24h Range):  Temp:  [97.5 °F (36.4 °C)-98.5 °F (36.9 °C)] 97.6 °F (36.4 °C)  Pulse:  [64-77] 70  Resp:  [16-18] 18  SpO2:  [91 %-98 %] 96 %  BP: ()/(57-86) 105/61       Neurosurgery Physical Exam   General: well developed, well nourished, appears older than stated age, poor hygiene, no distress.   Head: normocephalic, small scalp hematoma to right temporal area  Neurologic: Alert, awake, interactive. Tangential speech.   GCS: Motor: 6/Verbal: 4/Eyes: 4 GCS Total: 14  Language: No aphasia  Speech: No dysarthria  Cranial nerves: face symmetric, tongue midline, CN II-XII grossly intact.   Eyes: pupils equal, round, reactive to light with accomodation, EOMI.   Pulmonary: normal respirations, no signs of respiratory  distress  Abdomen: soft, non-distended, not tender to palpation  Skin: Skin is warm, dry and intact.  Sensory: intact to light touch throughout  Motor Strength: Moves all extremities spontaneously with good tone.  Full strength upper and lower extremities. No abnormal movements seen.   Squires's: Negative.  Clonus: Negative.        Significant Labs:  Recent Labs   Lab 04/03/19  0509 04/04/19  0422   GLU 97 99   * 133*   K 3.6 3.9   CL 97 98   CO2 27 26   BUN 8 13   CREATININE 0.6 0.6   CALCIUM 9.3 8.5*   MG 1.8  --      Recent Labs   Lab 04/03/19  0509 04/04/19  0422   WBC 10.14 7.82   HGB 13.4* 11.5*   HCT 39.6* 33.9*    286     Recent Labs   Lab 04/03/19  1304   INR 0.9   APTT 27.7

## 2019-04-04 NOTE — PLAN OF CARE
Problem: Fall Injury Risk  Goal: Absence of Fall and Fall-Related Injury    Intervention: Promote Injury-Free Environment  Pt VSS throughout shift. Pt had intermittent periods of orientation with slurred muffled speech. Pt c/o neck pain and lower back pain, treated with hydrocodone with moderate relief. At 0300 pt seemed agitated and removed c-collar while trying to reach belongings. Pt belongings moved to nurses station due to PEC. Pt calmed verbally. CARLOS Tejeda is present at bedside with pt. Pt remained free from falls. Bed in lowest position, call bell within reach, pt had no further needs at this time.
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
## 2019-04-04 NOTE — ASSESSMENT & PLAN NOTE
69 year old male with a PMHx of BCC, depression, T2DM, GERD, HTN, and HLD, who originally presented to the [facility] ED due to altered mental status. While in the ED, he complained of acute onset of neck pain. CT cervical spine showed a nondisplaced type 2 dens fracture.     -Patient neurologically stable on exam  -Continue collar to be worn at all times  -Surgery on hold due to PEC. Continue to hold  mg in preparation for surgery in the future.  -Agitation/Hallucinations: Continue PEC and bedside sitter at all times. Increase Risperdal to 1 mg qAM and 1 mg qHS. Continue Depakote 250 mg BID and Zyprexa PRN. DC Elavil. Patient medically stable, no current need for a medicine consult. Would appreciate transfer to [facility] service for inpatient psychiatric care until PEC can be lifted and surgery can be performed.   -HTN: Continue home dose of Carvedilol, Amlodipine, and Quinapril.   -T2DM: Diabetic diet, ISS, POCT x 4  -HLD: Continue home dose of Atorvastatin  -Emphysema: Continue home dose of Albuterol PRN wheezing  -Tobacco abuse: Continue Nicotine patch daily  -DVT prophylaxis: MAIDA's, SCD's  -Bowel regimen: senna and miralax daily  -Atelectasis prevention: IS hourly while awake      DISPO: Unable to discharge due to active PEC

## 2019-04-05 ENCOUNTER — HOSPITAL ENCOUNTER (INPATIENT)
Facility: HOSPITAL | Age: 69
LOS: 5 days | Discharge: HOME | DRG: 885 | End: 2019-04-10
Attending: PSYCHIATRY & NEUROLOGY | Admitting: PSYCHIATRY & NEUROLOGY
Payer: MEDICARE

## 2019-04-05 VITALS
DIASTOLIC BLOOD PRESSURE: 60 MMHG | RESPIRATION RATE: 18 BRPM | BODY MASS INDEX: 23.62 KG/M2 | WEIGHT: 165 LBS | HEART RATE: 75 BPM | SYSTOLIC BLOOD PRESSURE: 117 MMHG | HEIGHT: 70 IN | TEMPERATURE: 97 F | OXYGEN SATURATION: 97 %

## 2019-04-05 DIAGNOSIS — J43.9 PULMONARY EMPHYSEMA, UNSPECIFIED EMPHYSEMA TYPE: ICD-10-CM

## 2019-04-05 DIAGNOSIS — E11.29 CONTROLLED TYPE 2 DIABETES MELLITUS WITH MICROALBUMINURIA, WITHOUT LONG-TERM CURRENT USE OF INSULIN: ICD-10-CM

## 2019-04-05 DIAGNOSIS — I10 ESSENTIAL HYPERTENSION: ICD-10-CM

## 2019-04-05 DIAGNOSIS — E78.2 MIXED HYPERLIPIDEMIA: ICD-10-CM

## 2019-04-05 DIAGNOSIS — F19.959 SUBSTANCE-INDUCED PSYCHOTIC DISORDER: ICD-10-CM

## 2019-04-05 DIAGNOSIS — F29 PSYCHOSIS, UNSPECIFIED PSYCHOSIS TYPE: Chronic | ICD-10-CM

## 2019-04-05 DIAGNOSIS — S12.112A CLOSED NONDISPLACED ODONTOID FRACTURE WITH TYPE II MORPHOLOGY, INITIAL ENCOUNTER: ICD-10-CM

## 2019-04-05 DIAGNOSIS — R80.9 CONTROLLED TYPE 2 DIABETES MELLITUS WITH MICROALBUMINURIA, WITHOUT LONG-TERM CURRENT USE OF INSULIN: ICD-10-CM

## 2019-04-05 DIAGNOSIS — F25.9 SCHIZOAFFECTIVE DISORDER, UNSPECIFIED TYPE: ICD-10-CM

## 2019-04-05 LAB — POCT GLUCOSE: 164 MG/DL (ref 70–110)

## 2019-04-05 PROCEDURE — 99232 PR SUBSEQUENT HOSPITAL CARE,LEVL II: ICD-10-PCS | Mod: ,,, | Performed by: PHYSICIAN ASSISTANT

## 2019-04-05 PROCEDURE — 93010 EKG 12-LEAD: ICD-10-PCS | Mod: ,,, | Performed by: INTERNAL MEDICINE

## 2019-04-05 PROCEDURE — 99232 SBSQ HOSP IP/OBS MODERATE 35: CPT | Mod: ,,, | Performed by: PHYSICIAN ASSISTANT

## 2019-04-05 PROCEDURE — 25000003 PHARM REV CODE 250: Performed by: PSYCHIATRY & NEUROLOGY

## 2019-04-05 PROCEDURE — 12400001 HC PSYCH SEMI-PRIVATE ROOM

## 2019-04-05 PROCEDURE — S4991 NICOTINE PATCH NONLEGEND: HCPCS | Performed by: PHYSICIAN ASSISTANT

## 2019-04-05 PROCEDURE — 25000003 PHARM REV CODE 250: Performed by: PHYSICIAN ASSISTANT

## 2019-04-05 PROCEDURE — 93010 ELECTROCARDIOGRAM REPORT: CPT | Mod: ,,, | Performed by: INTERNAL MEDICINE

## 2019-04-05 PROCEDURE — 93005 ELECTROCARDIOGRAM TRACING: CPT

## 2019-04-05 RX ORDER — OLANZAPINE 5 MG/1
5 TABLET ORAL EVERY 8 HOURS PRN
Status: DISCONTINUED | OUTPATIENT
Start: 2019-04-05 | End: 2019-04-10 | Stop reason: HOSPADM

## 2019-04-05 RX ORDER — IPRATROPIUM BROMIDE AND ALBUTEROL SULFATE 2.5; .5 MG/3ML; MG/3ML
3 SOLUTION RESPIRATORY (INHALATION) EVERY 4 HOURS PRN
Status: CANCELLED | OUTPATIENT
Start: 2019-04-05

## 2019-04-05 RX ORDER — INSULIN ASPART 100 [IU]/ML
0-5 INJECTION, SOLUTION INTRAVENOUS; SUBCUTANEOUS
Status: CANCELLED | OUTPATIENT
Start: 2019-04-05

## 2019-04-05 RX ORDER — DIVALPROEX SODIUM 250 MG/1
250 TABLET, DELAYED RELEASE ORAL 2 TIMES DAILY
Status: DISCONTINUED | OUTPATIENT
Start: 2019-04-05 | End: 2019-04-10 | Stop reason: HOSPADM

## 2019-04-05 RX ORDER — CYCLOBENZAPRINE HCL 5 MG
5 TABLET ORAL 3 TIMES DAILY PRN
Status: CANCELLED | OUTPATIENT
Start: 2019-04-05

## 2019-04-05 RX ORDER — RISPERIDONE 1 MG/1
1 TABLET ORAL DAILY
Status: DISCONTINUED | OUTPATIENT
Start: 2019-04-06 | End: 2019-04-10 | Stop reason: HOSPADM

## 2019-04-05 RX ORDER — IBUPROFEN 200 MG
24 TABLET ORAL
Status: CANCELLED | OUTPATIENT
Start: 2019-04-05

## 2019-04-05 RX ORDER — ACETAMINOPHEN 325 MG/1
650 TABLET ORAL EVERY 6 HOURS PRN
Status: CANCELLED | OUTPATIENT
Start: 2019-04-05

## 2019-04-05 RX ORDER — OLANZAPINE 10 MG/2ML
5 INJECTION, POWDER, FOR SOLUTION INTRAMUSCULAR EVERY 8 HOURS PRN
Status: DISCONTINUED | OUTPATIENT
Start: 2019-04-05 | End: 2019-04-10 | Stop reason: HOSPADM

## 2019-04-05 RX ORDER — AMOXICILLIN 250 MG
1 CAPSULE ORAL 2 TIMES DAILY
Status: CANCELLED | OUTPATIENT
Start: 2019-04-05

## 2019-04-05 RX ORDER — HEPARIN SODIUM 5000 [USP'U]/ML
5000 INJECTION, SOLUTION INTRAVENOUS; SUBCUTANEOUS EVERY 8 HOURS
Status: DISCONTINUED | OUTPATIENT
Start: 2019-04-05 | End: 2019-04-05

## 2019-04-05 RX ORDER — IBUPROFEN 200 MG
16 TABLET ORAL
Status: CANCELLED | OUTPATIENT
Start: 2019-04-05

## 2019-04-05 RX ORDER — IBUPROFEN 200 MG
1 TABLET ORAL DAILY PRN
Status: DISCONTINUED | OUTPATIENT
Start: 2019-04-05 | End: 2019-04-10 | Stop reason: HOSPADM

## 2019-04-05 RX ORDER — RISPERIDONE 1 MG/1
1 TABLET ORAL DAILY
Status: CANCELLED | OUTPATIENT
Start: 2019-04-06

## 2019-04-05 RX ORDER — POLYETHYLENE GLYCOL 3350 17 G/17G
17 POWDER, FOR SOLUTION ORAL DAILY
Status: DISCONTINUED | OUTPATIENT
Start: 2019-04-06 | End: 2019-04-10 | Stop reason: HOSPADM

## 2019-04-05 RX ORDER — AMITRIPTYLINE HYDROCHLORIDE 25 MG/1
25 TABLET, FILM COATED ORAL NIGHTLY PRN
Status: CANCELLED | OUTPATIENT
Start: 2019-04-05

## 2019-04-05 RX ORDER — DOCUSATE SODIUM 100 MG/1
100 CAPSULE, LIQUID FILLED ORAL DAILY PRN
Status: DISCONTINUED | OUTPATIENT
Start: 2019-04-05 | End: 2019-04-10 | Stop reason: HOSPADM

## 2019-04-05 RX ORDER — RISPERIDONE 1 MG/1
2 TABLET ORAL NIGHTLY
Status: DISCONTINUED | OUTPATIENT
Start: 2019-04-05 | End: 2019-04-06

## 2019-04-05 RX ORDER — ATORVASTATIN CALCIUM 20 MG/1
40 TABLET, FILM COATED ORAL DAILY
Status: CANCELLED | OUTPATIENT
Start: 2019-04-05

## 2019-04-05 RX ORDER — RISPERIDONE 1 MG/1
2 TABLET ORAL NIGHTLY
Status: CANCELLED | OUTPATIENT
Start: 2019-04-05

## 2019-04-05 RX ORDER — AMLODIPINE BESYLATE 10 MG/1
10 TABLET ORAL DAILY
Status: CANCELLED | OUTPATIENT
Start: 2019-04-06

## 2019-04-05 RX ORDER — AMLODIPINE BESYLATE 10 MG/1
10 TABLET ORAL DAILY
Status: DISCONTINUED | OUTPATIENT
Start: 2019-04-06 | End: 2019-04-10 | Stop reason: HOSPADM

## 2019-04-05 RX ORDER — HYDROCODONE BITARTRATE AND ACETAMINOPHEN 5; 325 MG/1; MG/1
1 TABLET ORAL EVERY 6 HOURS PRN
Status: CANCELLED | OUTPATIENT
Start: 2019-04-05

## 2019-04-05 RX ORDER — OXYCODONE AND ACETAMINOPHEN 5; 325 MG/1; MG/1
1 TABLET ORAL EVERY 4 HOURS PRN
Status: DISCONTINUED | OUTPATIENT
Start: 2019-04-05 | End: 2019-04-10 | Stop reason: HOSPADM

## 2019-04-05 RX ORDER — HYDROXYZINE PAMOATE 50 MG/1
50 CAPSULE ORAL NIGHTLY PRN
Status: DISCONTINUED | OUTPATIENT
Start: 2019-04-05 | End: 2019-04-10 | Stop reason: HOSPADM

## 2019-04-05 RX ORDER — ATORVASTATIN CALCIUM 20 MG/1
40 TABLET, FILM COATED ORAL DAILY
Status: CANCELLED | OUTPATIENT
Start: 2019-04-06

## 2019-04-05 RX ORDER — ACETAMINOPHEN 325 MG/1
650 TABLET ORAL EVERY 6 HOURS PRN
Status: DISCONTINUED | OUTPATIENT
Start: 2019-04-05 | End: 2019-04-10 | Stop reason: HOSPADM

## 2019-04-05 RX ORDER — IBUPROFEN 200 MG
1 TABLET ORAL DAILY
Status: CANCELLED | OUTPATIENT
Start: 2019-04-06

## 2019-04-05 RX ORDER — ALBUTEROL SULFATE 90 UG/1
2 AEROSOL, METERED RESPIRATORY (INHALATION) EVERY 6 HOURS PRN
Status: CANCELLED | OUTPATIENT
Start: 2019-04-05

## 2019-04-05 RX ORDER — CARVEDILOL 25 MG/1
25 TABLET ORAL 2 TIMES DAILY
Status: CANCELLED | OUTPATIENT
Start: 2019-04-05

## 2019-04-05 RX ORDER — LOPERAMIDE HYDROCHLORIDE 2 MG/1
2 CAPSULE ORAL
Status: DISCONTINUED | OUTPATIENT
Start: 2019-04-05 | End: 2019-04-10 | Stop reason: HOSPADM

## 2019-04-05 RX ORDER — QUINAPRIL 40 MG/1
40 TABLET ORAL DAILY
Status: CANCELLED | OUTPATIENT
Start: 2019-04-06

## 2019-04-05 RX ORDER — OLANZAPINE 2.5 MG/1
5 TABLET ORAL EVERY 6 HOURS PRN
Status: CANCELLED | OUTPATIENT
Start: 2019-04-05

## 2019-04-05 RX ORDER — OXYCODONE AND ACETAMINOPHEN 5; 325 MG/1; MG/1
1 TABLET ORAL EVERY 4 HOURS PRN
Status: CANCELLED | OUTPATIENT
Start: 2019-04-05

## 2019-04-05 RX ORDER — QUINAPRIL 40 MG/1
40 TABLET ORAL DAILY
Status: DISCONTINUED | OUTPATIENT
Start: 2019-04-06 | End: 2019-04-10 | Stop reason: HOSPADM

## 2019-04-05 RX ORDER — GLUCAGON 1 MG
1 KIT INJECTION
Status: CANCELLED | OUTPATIENT
Start: 2019-04-05

## 2019-04-05 RX ORDER — INSULIN ASPART 100 [IU]/ML
1-10 INJECTION, SOLUTION INTRAVENOUS; SUBCUTANEOUS
Status: CANCELLED | OUTPATIENT
Start: 2019-04-05

## 2019-04-05 RX ORDER — ATORVASTATIN CALCIUM 20 MG/1
40 TABLET, FILM COATED ORAL DAILY
Status: DISCONTINUED | OUTPATIENT
Start: 2019-04-06 | End: 2019-04-10 | Stop reason: HOSPADM

## 2019-04-05 RX ORDER — CARVEDILOL 12.5 MG/1
25 TABLET ORAL 2 TIMES DAILY
Status: DISCONTINUED | OUTPATIENT
Start: 2019-04-05 | End: 2019-04-10 | Stop reason: HOSPADM

## 2019-04-05 RX ORDER — DIVALPROEX SODIUM 250 MG/1
250 TABLET, DELAYED RELEASE ORAL 2 TIMES DAILY
Status: CANCELLED | OUTPATIENT
Start: 2019-04-05

## 2019-04-05 RX ORDER — SENNOSIDES 8.6 MG/1
8.6 TABLET ORAL DAILY PRN
Status: DISCONTINUED | OUTPATIENT
Start: 2019-04-05 | End: 2019-04-10 | Stop reason: HOSPADM

## 2019-04-05 RX ORDER — POLYETHYLENE GLYCOL 3350 17 G/17G
17 POWDER, FOR SOLUTION ORAL DAILY
Status: CANCELLED | OUTPATIENT
Start: 2019-04-06

## 2019-04-05 RX ORDER — RISPERIDONE 1 MG/1
1 TABLET ORAL 2 TIMES DAILY
Status: CANCELLED | OUTPATIENT
Start: 2019-04-05

## 2019-04-05 RX ORDER — CALCIUM CARBONATE 200(500)MG
1000 TABLET,CHEWABLE ORAL EVERY 8 HOURS PRN
Status: DISCONTINUED | OUTPATIENT
Start: 2019-04-05 | End: 2019-04-10 | Stop reason: HOSPADM

## 2019-04-05 RX ADMIN — HYDROCODONE BITARTRATE AND ACETAMINOPHEN 1 TABLET: 5; 325 TABLET ORAL at 08:04

## 2019-04-05 RX ADMIN — QUINAPRIL 40 MG: 40 TABLET ORAL at 08:04

## 2019-04-05 RX ADMIN — DIVALPROEX SODIUM 250 MG: 250 TABLET, DELAYED RELEASE ORAL at 08:04

## 2019-04-05 RX ADMIN — HYDROCODONE BITARTRATE AND ACETAMINOPHEN 1 TABLET: 5; 325 TABLET ORAL at 12:04

## 2019-04-05 RX ADMIN — SODIUM CHLORIDE TAB 1 GM 1 G: 1 TAB at 08:04

## 2019-04-05 RX ADMIN — AMLODIPINE BESYLATE 10 MG: 10 TABLET ORAL at 08:04

## 2019-04-05 RX ADMIN — RISPERIDONE 2 MG: 1 TABLET ORAL at 09:04

## 2019-04-05 RX ADMIN — STANDARDIZED SENNA CONCENTRATE AND DOCUSATE SODIUM 1 TABLET: 8.6; 5 TABLET, FILM COATED ORAL at 08:04

## 2019-04-05 RX ADMIN — CARVEDILOL 25 MG: 25 TABLET, FILM COATED ORAL at 08:04

## 2019-04-05 RX ADMIN — DIVALPROEX SODIUM 250 MG: 250 TABLET, DELAYED RELEASE ORAL at 09:04

## 2019-04-05 RX ADMIN — RISPERIDONE 1 MG: 1 TABLET ORAL at 08:04

## 2019-04-05 RX ADMIN — NICOTINE 1 PATCH: 21 PATCH, EXTENDED RELEASE TRANSDERMAL at 08:04

## 2019-04-05 RX ADMIN — POLYETHYLENE GLYCOL 3350 17 G: 17 POWDER, FOR SOLUTION ORAL at 08:04

## 2019-04-05 RX ADMIN — CARVEDILOL 25 MG: 12.5 TABLET, FILM COATED ORAL at 09:04

## 2019-04-05 RX ADMIN — ATORVASTATIN CALCIUM 40 MG: 20 TABLET, FILM COATED ORAL at 08:04

## 2019-04-05 NOTE — PROGRESS NOTES
Please consult neurosurgery once PEC has been lifted so surgical intervention can be performed.       Please call with any questions      Lizett Adkins PA-C   Neurosurgery   Pager: [phone]
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
## 2019-04-05 NOTE — NURSING
Pt transferred to psych unit via w/c with belongings and cane. Pt escorted by security and myself. No distress noted. IV removed without bleeding. Pt and receiving staff notified pt is to wear neck brace at all times.

## 2019-04-05 NOTE — PLAN OF CARE
Patient to be discharged to [facility].  Care deferred to [facility]. Neurosurgery clinic to schedule follow up appointment.    Future Appointments   Date Time Provider Department Center   4/11/2019  1:20 PM Jerman Franco MD [facility]   10/22/2019  9:00 AM REGINA Miller IV, MD [facility]        04/05/19 1318   Final Note   Assessment Type Final Discharge Note   Anticipated Discharge Disposition Psych   Hospital Follow Up  Appt(s) scheduled? No   Discharge plans and expectations educations in teach back method with documentation complete? No
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
## 2019-04-05 NOTE — ASSESSMENT & PLAN NOTE
· Continue home Quinapril 40mg PO daily  · Continue home Carvedilol 25mg PO BID  · Continue home Amlodipine 10mg PO daily
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
## 2019-04-05 NOTE — NURSING
Pt is admitted to [facility]. Pt is introduced to staff. Pt is given a walker, yellow socks placed on pt. Fall risk band applied to patient. Pt belongings itemized by MHAs. Pt is changed into new paper scrubs by Bertram, searched for contraband by Bertram, no contraband found. Pt arrives wearing a C-collar, he is instructed to wear at all times per MD notes. Pt is calm and compliant. He is disorganized with garbled speech and tangential. Pt arrives CEC. Pt arrives with multiple scabs and bruises to arms. Pt is instructed to inform staff when he ambulates from a sitting position.
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
## 2019-04-05 NOTE — H&P
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  Continue home Atorvastatin 40mg PO daily    Controlled type 2 diabetes mellitus with microalbuminuria, without long-term current use of insulin  · Continue Janumet 50-1000mg BID with meals  · Diabetic diet  · POCT x 4    Emphysema lung  Continue home dose of Albuterol PRN wheezing    Essential hypertension  · Continue home Quinapril 40mg PO daily  · Continue home Carvedilol 25mg PO BID  · Continue home Amlodipine 10mg PO daily       Estimated Discharge Date:   Initial Discharge Plan: Home    Prognosis: Fair    Need for Continued Hospitalization:   Psychiatric illness continues to pose a potential threat to life or bodily function, of self or others, thereby requiring the need for continued inpatient psychiatric hospitalization., Protective inpatient psychiatric hospitalization required while a safe disposition plan is enacted. and Requires ongoing hospitalization for stabilization of medications.    Total Time: 50 with greater than 50% of time spent in counseling and/or coordination of care.     Alena Montero,    Psychiatry  [facility]

## 2019-04-05 NOTE — SUBJECTIVE & OBJECTIVE
Interval History:   NAEON. Patient resting comfortably in bed. Sitter at bedside. Patient reports neck pain unchanged. Denies any UE pain or paresthesias. Patient continues to have repetitive, tangential speech. Tolerating diet. Voiding appropriately.        Medications:  Continuous Infusions:  Scheduled Meds:   amLODIPine  10 mg Oral Daily    atorvastatin  40 mg Oral Daily    carvedilol  25 mg Oral BID    divalproex  250 mg Oral BID    nicotine  1 patch Transdermal Daily    polyethylene glycol  17 g Oral Daily    quinapril  40 mg Oral Daily    risperiDONE  1 mg Oral Daily    risperiDONE  2 mg Oral QHS    senna-docusate 8.6-50 mg  1 tablet Oral BID    sodium chloride  1 g Oral BID     PRN Meds:acetaminophen, albuterol, amitriptyline, cyclobenzaprine, dextrose 50%, dextrose 50%, glucagon (human recombinant), glucose, glucose, glucose, HYDROcodone-acetaminophen, insulin aspart U-100, OLANZapine, OLANZapine     Review of Systems  Objective:     Weight: 74.8 kg (165 lb)  Body mass index is 23.68 kg/m².  Vital Signs (Most Recent):  Temp: 96.7 °F (35.9 °C) (04/05/19 0723)  Pulse: 75 (04/05/19 0723)  Resp: 18 (04/05/19 0723)  BP: 117/60 (04/05/19 0723)  SpO2: 96 % (04/05/19 0723) Vital Signs (24h Range):  Temp:  [96.7 °F (35.9 °C)-99.3 °F (37.4 °C)] 96.7 °F (35.9 °C)  Pulse:  [65-81] 75  Resp:  [18] 18  SpO2:  [91 %-97 %] 96 %  BP: (105-147)/(60-86) 117/60       Neurosurgery Physical Exam  General: well developed, well nourished, appears older than stated age, poor hygiene, no distress.   Head: normocephalic, small scalp hematoma to right temporal area  Neurologic: Alert, awake, interactive. Tangential speech.   GCS: Motor: 6/Verbal: 4/Eyes: 4 GCS Total: 14  Language: No aphasia  Speech: No dysarthria  Cranial nerves: face symmetric, tongue midline, CN II-XII grossly intact.   Eyes: pupils equal, round, reactive to light with accomodation, EOMI.   Pulmonary: normal respirations, no signs of respiratory  distress  Abdomen: soft, non-distended, not tender to palpation  Skin: Skin is warm, dry and intact.  Sensory: intact to light touch throughout  Motor Strength: Moves all extremities spontaneously with good tone.  Full strength upper and lower extremities. No abnormal movements seen.   Squires's: Negative.  Clonus: Negative.        Significant Labs:  Recent Labs   Lab 04/04/19 0422   GLU 99   *   K 3.9   CL 98   CO2 26   BUN 13   CREATININE 0.6   CALCIUM 8.5*     Recent Labs   Lab 04/04/19  0422   WBC 7.82   HGB 11.5*   HCT 33.9*        Recent Labs   Lab 04/03/19  1304   INR 0.9   APTT 27.7

## 2019-04-05 NOTE — NURSING
Report given to Anjel ALFONSO at [facility]. Pt to be fed prior to transfer.

## 2019-04-06 LAB
POCT GLUCOSE: 103 MG/DL (ref 70–110)
POCT GLUCOSE: 107 MG/DL (ref 70–110)
VALPROATE SERPL-MCNC: 24.8 UG/ML (ref 50–100)

## 2019-04-06 PROCEDURE — 25000003 PHARM REV CODE 250: Performed by: PSYCHIATRY & NEUROLOGY

## 2019-04-06 PROCEDURE — 99223 1ST HOSP IP/OBS HIGH 75: CPT | Mod: ,,, | Performed by: PSYCHIATRY & NEUROLOGY

## 2019-04-06 PROCEDURE — 80164 ASSAY DIPROPYLACETIC ACD TOT: CPT

## 2019-04-06 PROCEDURE — 36415 COLL VENOUS BLD VENIPUNCTURE: CPT

## 2019-04-06 PROCEDURE — 99223 PR INITIAL HOSPITAL CARE,LEVL III: ICD-10-PCS | Mod: ,,, | Performed by: PSYCHIATRY & NEUROLOGY

## 2019-04-06 PROCEDURE — 12400001 HC PSYCH SEMI-PRIVATE ROOM

## 2019-04-06 RX ORDER — RISPERIDONE 1 MG/1
3 TABLET ORAL NIGHTLY
Status: DISCONTINUED | OUTPATIENT
Start: 2019-04-06 | End: 2019-04-10 | Stop reason: HOSPADM

## 2019-04-06 RX ADMIN — CARVEDILOL 25 MG: 12.5 TABLET, FILM COATED ORAL at 08:04

## 2019-04-06 RX ADMIN — AMLODIPINE BESYLATE 10 MG: 10 TABLET ORAL at 08:04

## 2019-04-06 RX ADMIN — DIVALPROEX SODIUM 250 MG: 250 TABLET, DELAYED RELEASE ORAL at 08:04

## 2019-04-06 RX ADMIN — RISPERIDONE 3 MG: 1 TABLET ORAL at 08:04

## 2019-04-06 RX ADMIN — ACETAMINOPHEN 650 MG: 325 TABLET ORAL at 04:04

## 2019-04-06 RX ADMIN — RISPERIDONE 1 MG: 1 TABLET ORAL at 08:04

## 2019-04-06 RX ADMIN — QUINAPRIL 40 MG: 40 TABLET ORAL at 08:04

## 2019-04-06 RX ADMIN — POLYETHYLENE GLYCOL 3350 17 G: 17 POWDER, FOR SOLUTION ORAL at 08:04

## 2019-04-06 RX ADMIN — OXYCODONE HYDROCHLORIDE AND ACETAMINOPHEN 1 TABLET: 5; 325 TABLET ORAL at 02:04

## 2019-04-06 RX ADMIN — THERA TABS 1 TABLET: TAB at 08:04

## 2019-04-06 RX ADMIN — OXYCODONE HYDROCHLORIDE AND ACETAMINOPHEN 1 TABLET: 5; 325 TABLET ORAL at 10:04

## 2019-04-06 RX ADMIN — ATORVASTATIN CALCIUM 40 MG: 20 TABLET, FILM COATED ORAL at 08:04

## 2019-04-06 NOTE — PROGRESS NOTES
Pt. Gave  permission to make contact with Stephanie cardona  ( [phone]) patient also gave Sw permission to make contact with patient's daughter Rei however patient could not remember the phone number.       Patient denies si or hi. Patient stated he heard a voice in the background, but it only one time. Patient has contracted for safety.
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
## 2019-04-06 NOTE — PROGRESS NOTES
04/06/19 0900 04/06/19 1000 04/06/19 1100   [facility] Group Therapy   Group Name Community Reintegration Mental Awareness Stress Management   Specific Interventions Current Events Cognitive Stimulation Training Guided Imagery/Relaxation   Participation Level Active;Supportive;Appropriate Active;Supportive;Appropriate Minimal   Participation Quality Requires Prompting;Disruptive Cooperative;Social Disruptive;Needs Redirection   Insight/Motivation Limited Improved Limited   Affect/Mood Display Bizarre;Bright Bizarre Restless   Cognition Oriented;Pre-Occupied Oriented Oriented

## 2019-04-06 NOTE — PLAN OF CARE
Acute Psychiatric Unit  Psychosocial History and Assessment  Progress Note      Patient Name: Conrad Wilcox YOB: 1950 SW: Daiana Mccoy, RSW Date: 4/6/2019    Chief Complaint: psychosis    Consent:     Did the patient consent for an interview with the ? Yes    Did the patient consent for the  to contact family/friend/caregiver?   Yes  Name: Parminder Wynne, Relationship: house mate and Contact: [phone]    Did the patient give consent for the  to inform family/friend/caregiver of his/her whereabouts or to discuss discharge planning? Yes    Source of Information: Face to face with patient and Telephone interview with family/friend/caregiver    Is information obtained from interviews considered reliable?   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       History of Present Illness - (Patient Perception):   Patient is unable to participate in giving data pertinent to treatment and discharge, patient stated he does not remember how he arrived her and why he was admitted. Patient endorsed depression     History of Present Illness - (Perception of Others):  according to h&p:  who presents to the ED with altered mental status.  According to friend, patient has been falling a lot lately. As well, he has been having rambling/disorganized thoughts. Furthermore, he has been having auditory and visual hallucinations and apparently tried to climb out of a window  Present biopsychosocial  functioning: patient was pleasant upon approach. Patient was unable to focus, patient's was disorganized. Patient was unable to complete one thought before moving on to the next topic    Past biopsychosocial functioning: patient resides in [place] with a housemates. Patient stated the last time he saw his daughter was over 40 years ago. Patient stated he speaks to daughter regularly     Family and Marital/Relationship History:     Significant Other/Partner Relationships:  : Relationship cutoff    Family Relationships: Intact, with his daughter    Culture and Pentecostal:     Pentecostal: Non-Taoist    How strong of a role does Mosque and spirituality play in patient's life? Unable to access   Advent or spiritual concerns regarding treatment: not applicable     History of Abuse:   History of Abuse: Denies      Outcome: none     Psychiatric and Medical History:     History of psychiatric illness or treatment: prior inpatient treatment    Medical history:   Past Medical History:   Diagnosis Date    Basal cell carcinoma     Chronic airway obstruction, not elsewhere classified     Depressive disorder, not elsewhere classified     Heartburn     Hypertension     Pure hypercholesterolemia        Substance Abuse History:     Alcohol - (Patient Perspective): patient denies  Social History     Substance and Sexual Activity   Alcohol Use No    Alcohol/week: 0.0 oz   Alcohol - (Collateral Perspective):  according to Parminder, patient does not drink alcohol    Drugs - (Patient Perspective): patient denies   Social History     Substance and Sexual Activity   Drug Use No   Drugs - (Collateral Perspective) according to Parminder, patient does not use  alcohol     Additional Comments: none     Education:     Currently Enrolled? No  Attended College/Technical School    Special Education? No    Interested in Completing Education/GED: No    Employment and Financial:     Currently employed? Unemployed     Source of Income:  SSD    Able to afford basic needs (food, shelter, utilities)? Yes, barely    Who manages finances/personal affairs? Patient      Service:     [place]? yes: [employer], date: 1025-3110    Combat Service? No     Community Resources:     Describe present use of community resources: [facility]   Identify previously used community resources   [facility]     Environmental:     Current living situation:Lives with family    Social Evaluation:     Patient Assets: Supportive family/friends    Patient Limitations: patient has limited insight     High risk psychosocial issues that may impact discharge planning:   Patient may have housing issues, if landlord sells property     Recommendations:     Anticipated discharge plan:   outpatient follow up    High risk issues requiring early treatment planning and immediate intervention: Patient may have housing issues, if landlord sells property   Community resources needed for discharge planning:  aftercare treatment sources    Anticipated social work role(s) in treatment and discharge planning: ensure patient has follow up appointment , group therapy and any additional patient needs

## 2019-04-06 NOTE — PLAN OF CARE
"Problem: Adult Behavioral Health Plan of Care  Goal: Plan of Care Review  Outcome: Ongoing (interventions implemented as appropriate)  The patient has been restless during the night. He sat up in the chair for a while & feel asleep in the chair. During evening hours he was engaging upon approach. His speech was difficult to understand which may have been as a result of several missing teeth. His thought process was disorganized. He denied S/HI, AH. He endorsed VH of a "see thru shadow" that he refers to as "Mr. Morgan." He maintained an unkempt appearance. He was incontinent of urine once during the early AM hours. He ambulated w/ the aid of a walker. He is maintained on DVC, loretta RN @ the bedside. Fall precautions maintained.      "
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
## 2019-04-07 LAB
POCT GLUCOSE: 110 MG/DL (ref 70–110)
POCT GLUCOSE: 99 MG/DL (ref 70–110)

## 2019-04-07 PROCEDURE — 25000003 PHARM REV CODE 250: Performed by: PSYCHIATRY & NEUROLOGY

## 2019-04-07 PROCEDURE — 99233 SBSQ HOSP IP/OBS HIGH 50: CPT | Mod: ,,, | Performed by: PSYCHIATRY & NEUROLOGY

## 2019-04-07 PROCEDURE — 99233 PR SUBSEQUENT HOSPITAL CARE,LEVL III: ICD-10-PCS | Mod: ,,, | Performed by: PSYCHIATRY & NEUROLOGY

## 2019-04-07 PROCEDURE — 12400001 HC PSYCH SEMI-PRIVATE ROOM

## 2019-04-07 RX ORDER — METFORMIN HYDROCHLORIDE 500 MG/1
1000 TABLET ORAL 2 TIMES DAILY WITH MEALS
Status: DISCONTINUED | OUTPATIENT
Start: 2019-04-07 | End: 2019-04-10 | Stop reason: HOSPADM

## 2019-04-07 RX ADMIN — CARVEDILOL 25 MG: 12.5 TABLET, FILM COATED ORAL at 08:04

## 2019-04-07 RX ADMIN — DIVALPROEX SODIUM 250 MG: 250 TABLET, DELAYED RELEASE ORAL at 09:04

## 2019-04-07 RX ADMIN — METFORMIN HYDROCHLORIDE 1000 MG: 500 TABLET ORAL at 04:04

## 2019-04-07 RX ADMIN — CARVEDILOL 25 MG: 12.5 TABLET, FILM COATED ORAL at 09:04

## 2019-04-07 RX ADMIN — DIVALPROEX SODIUM 250 MG: 250 TABLET, DELAYED RELEASE ORAL at 08:04

## 2019-04-07 RX ADMIN — POLYETHYLENE GLYCOL 3350 17 G: 17 POWDER, FOR SOLUTION ORAL at 09:04

## 2019-04-07 RX ADMIN — AMLODIPINE BESYLATE 10 MG: 10 TABLET ORAL at 09:04

## 2019-04-07 RX ADMIN — OXYCODONE HYDROCHLORIDE AND ACETAMINOPHEN 1 TABLET: 5; 325 TABLET ORAL at 09:04

## 2019-04-07 RX ADMIN — ACETAMINOPHEN 650 MG: 325 TABLET ORAL at 04:04

## 2019-04-07 RX ADMIN — THERA TABS 1 TABLET: TAB at 09:04

## 2019-04-07 RX ADMIN — RISPERIDONE 3 MG: 1 TABLET ORAL at 08:04

## 2019-04-07 RX ADMIN — RISPERIDONE 1 MG: 1 TABLET ORAL at 09:04

## 2019-04-07 RX ADMIN — ATORVASTATIN CALCIUM 40 MG: 20 TABLET, FILM COATED ORAL at 09:04

## 2019-04-07 RX ADMIN — QUINAPRIL 40 MG: 40 TABLET ORAL at 09:04

## 2019-04-07 NOTE — PSYCH
Pt stated that he does not have Janumet medication with him and that no one is able to bring it to him while he is here  on unit. Dr. Rodriguez and Lizzeth from Pharmacy notified.

## 2019-04-07 NOTE — PLAN OF CARE
Problem: Adult Behavioral Health Plan of Care  Goal: Plan of Care Review  Outcome: Ongoing (interventions implemented as appropriate)  The patient was out in the milieu during evening hours. He remains on DVC for history of falls. He ambulates w/ the aid of a walker. Sitter  W/ patient in the dayroom. His grooming & hygiene is improved as his long unkempt beard was shaved by male staff. He denies S/HI, A/VH. His thought process remains tangential. He appears to be reality based. His CBG's are daily. He was medicated for 4-5 pain to his lower back w/ good results, no further c/o pain. He is maintained on DVC, fall precautions. Supportive milieu maintained.

## 2019-04-07 NOTE — PROGRESS NOTES
04/07/19 1100   [facility] Group Therapy   Group Name Therapeutic Recreation   Specific Interventions Guided Imagery/Relaxation   Participation Level Appropriate   Participation Quality Cooperative   Insight/Motivation Good   Affect/Mood Display Appropriate   Cognition Oriented

## 2019-04-07 NOTE — PLAN OF CARE
Per discharge summary patient on tx with:     JANUMET 50-1,000 mg per tablet  Generic drug:  SITagliptan-metformin  TAKE ONE TABLET BY MOUTH TWICE DAILY WITH MEALS    Will resume during [facility] hospitalization, ordered as non-formulary.     Interval update: Patient unable to bring rx from home, will defer to pharmacy if not available inpatient in this preparation if there is a suitable alternative. BG well controlled, no acute management concerns.     Rosa Rodriguez MD  PGY II Psychiatry

## 2019-04-07 NOTE — PROGRESS NOTES
04/07/19 1000   [facility] Group Therapy   Group Name Therapeutic Recreation   Specific Interventions Cognitive Stimulation Training   Participation Level Appropriate   Participation Quality Cooperative   Insight/Motivation Good   Affect/Mood Display Appropriate   Cognition Oriented
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
## 2019-04-07 NOTE — PSYCH
"Pt complained of feeling "bad". VS at 1510: 143/75, HR=65, R=18. MZT=671. Pt encouraged to drink some water and encouraged to request help from staff if needed with ambulation. Reinforced importance of using walker for ambulation. Pt verbalized understanding. Falls precautions maintained. Will cont to monitor.  "
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
## 2019-04-07 NOTE — PROGRESS NOTES
04/06/19 2000   [facility] Group Therapy   Group Name Community Reintegration   Specific Interventions Current Events   Participation Level Appropriate   Participation Quality Cooperative   Insight/Motivation Good   Affect/Mood Display Appropriate   Cognition Alert

## 2019-04-07 NOTE — PROGRESS NOTES
04/07/19 1110   [facility] Group Therapy   Group Name Stress Management   Specific Interventions Guided Imagery/Relaxation   Participation Level Appropriate;Minimal   Participation Quality Cooperative   Insight/Motivation Good   Affect/Mood Display Appropriate   Cognition Alert
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
## 2019-04-07 NOTE — PROGRESS NOTES
04/07/19 0900   [facility] Group Therapy   Group Name Therapeutic Recreation   Specific Interventions Social Skills Training   Participation Level Appropriate   Participation Quality Cooperative   Insight/Motivation Good   Affect/Mood Display Appropriate   Cognition Oriented

## 2019-04-08 PROBLEM — F25.9 SCHIZOAFFECTIVE DISORDER: Status: ACTIVE | Noted: 2019-04-03

## 2019-04-08 LAB
POCT GLUCOSE: 101 MG/DL (ref 70–110)
POCT GLUCOSE: 91 MG/DL (ref 70–110)
POCT GLUCOSE: 97 MG/DL (ref 70–110)

## 2019-04-08 PROCEDURE — 99232 PR SUBSEQUENT HOSPITAL CARE,LEVL II: ICD-10-PCS | Mod: ,,, | Performed by: PSYCHIATRY & NEUROLOGY

## 2019-04-08 PROCEDURE — 90853 PR GROUP PSYCHOTHERAPY: ICD-10-PCS | Mod: ,,, | Performed by: PSYCHOLOGIST

## 2019-04-08 PROCEDURE — 25000003 PHARM REV CODE 250: Performed by: PSYCHIATRY & NEUROLOGY

## 2019-04-08 PROCEDURE — 12400001 HC PSYCH SEMI-PRIVATE ROOM

## 2019-04-08 PROCEDURE — 99232 SBSQ HOSP IP/OBS MODERATE 35: CPT | Mod: ,,, | Performed by: PSYCHIATRY & NEUROLOGY

## 2019-04-08 PROCEDURE — 90833 PR PSYCHOTHERAPY W/PATIENT W/E&M, 30 MIN (ADD ON): ICD-10-PCS | Mod: ,,, | Performed by: PSYCHIATRY & NEUROLOGY

## 2019-04-08 PROCEDURE — 90853 GROUP PSYCHOTHERAPY: CPT | Mod: ,,, | Performed by: PSYCHOLOGIST

## 2019-04-08 PROCEDURE — 90833 PSYTX W PT W E/M 30 MIN: CPT | Mod: ,,, | Performed by: PSYCHIATRY & NEUROLOGY

## 2019-04-08 RX ADMIN — DIVALPROEX SODIUM 250 MG: 250 TABLET, DELAYED RELEASE ORAL at 09:04

## 2019-04-08 RX ADMIN — METFORMIN HYDROCHLORIDE 1000 MG: 500 TABLET ORAL at 09:04

## 2019-04-08 RX ADMIN — METFORMIN HYDROCHLORIDE 1000 MG: 500 TABLET ORAL at 05:04

## 2019-04-08 RX ADMIN — AMLODIPINE BESYLATE 10 MG: 10 TABLET ORAL at 09:04

## 2019-04-08 RX ADMIN — CARVEDILOL 25 MG: 12.5 TABLET, FILM COATED ORAL at 08:04

## 2019-04-08 RX ADMIN — QUINAPRIL 40 MG: 40 TABLET ORAL at 09:04

## 2019-04-08 RX ADMIN — CARVEDILOL 25 MG: 12.5 TABLET, FILM COATED ORAL at 09:04

## 2019-04-08 RX ADMIN — THERA TABS 1 TABLET: TAB at 09:04

## 2019-04-08 RX ADMIN — DIVALPROEX SODIUM 250 MG: 250 TABLET, DELAYED RELEASE ORAL at 08:04

## 2019-04-08 RX ADMIN — SITAGLIPTIN 100 MG: 100 TABLET, FILM COATED ORAL at 09:04

## 2019-04-08 RX ADMIN — OXYCODONE HYDROCHLORIDE AND ACETAMINOPHEN 1 TABLET: 5; 325 TABLET ORAL at 05:04

## 2019-04-08 RX ADMIN — RISPERIDONE 1 MG: 1 TABLET ORAL at 09:04

## 2019-04-08 RX ADMIN — RISPERIDONE 3 MG: 1 TABLET ORAL at 08:04

## 2019-04-08 RX ADMIN — POLYETHYLENE GLYCOL 3350 17 G: 17 POWDER, FOR SOLUTION ORAL at 09:04

## 2019-04-08 RX ADMIN — ATORVASTATIN CALCIUM 40 MG: 20 TABLET, FILM COATED ORAL at 09:04

## 2019-04-08 NOTE — NURSING
Pt is given hand held telephone to call his roommate. Pt assisted by MHA to dial. Call attempted many times, no answer or voicemail.
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
## 2019-04-08 NOTE — ASSESSMENT & PLAN NOTE
69 year old male with a PMHx of BCC, depression, T2DM, GERD, HTN, and HLD, who originally presented to the [facility] ED due to altered mental status. While in the ED, he complained of acute onset of neck pain. CT cervical spine showed a nondisplaced type 2 dens fracture.      Patient neurologically stable on exam  Patient will need to wear his plastic C-collar at all times.  Surgery on hold due to PEC/CEC, but will speak with surgery team as we feel patient is stable for surgery. Continue to hold  mg in preparation for surgery in the future.
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
## 2019-04-08 NOTE — ASSESSMENT & PLAN NOTE
Continue home Quinapril 40mg PO daily  Continue home Carvedilol 25mg PO BID  Continue home Amlodipine 10mg PO daily
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
## 2019-04-08 NOTE — PROGRESS NOTES
04/08/19 0900 04/08/19 1000 04/08/19 1100   [facility] Group Therapy   Group Name Community Reintegration Education Education   Specific Interventions Current Events Relapse Prevention Guided Imagery/Relaxation   Participation Level Active Active;Appropriate Active;Appropriate   Participation Quality Cooperative Cooperative Cooperative   Insight/Motivation Good Good Good   Affect/Mood Display Appropriate Appropriate Appropriate   Cognition Alert Alert Alert      04/08/19 1300   [facility] Group Therapy   Group Name Therapeutic Recreation   Specific Interventions Skilled Activity Crafts   Participation Level Minimal;Active   Participation Quality Cooperative   Insight/Motivation Good   Affect/Mood Display Appropriate   Cognition Alert
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
## 2019-04-08 NOTE — PLAN OF CARE
"Problem: Diabetes Comorbidity  Goal: Blood Glucose Level Within Desired Range  Outcome: Ongoing (interventions implemented as appropriate)  Pt compliant with q shift accu-check and diabetic diet. Pt compliant with medications.     Problem: Psychomotor Movement Impairment (Psychotic Signs/Symptoms)  Goal: Improved Psychomotor Symptoms (Psychotic Signs/Symptoms)  Outcome: Ongoing (interventions implemented as appropriate)  Pt compliant with all medications.   Pt remains calm and compliant. He has not been agitated or hostile. Pt reports seeing a "fuzzy bunny" while on the unit. He denies AH. Pt attends groups, interactive in the milieu. Ambulates safely with a walker. Pt remains with c-collar per MD order.     "

## 2019-04-08 NOTE — PROGRESS NOTES
"Group Psychotherapy (PhD/LCSW)    Site: [facility]    Clinical status of patient: Inpatient    Date: 4/8/2019    Group Focus: Life Skills    Length of service: [id number] - 35-40 minutes    Number of patients in attendance: 9    Referred by: Acute Psychiatry Unit Treatment Team    Target symptoms: Mood Disorder and Psychosis    Patient's response to treatment: Active Listening and Self-disclosure    Progress toward goals: Progressing slowly    Interval History: Pt appeared alert and attentive in group. Pt participated actively when prompted in a discussion of post-discharge coping strategies. Pt seemed to say that he needed to develop a plan of action for when he is discharged ("a road to follow") but he was vague on what that might entail.     Diagnosis: Schizoaffective d/o    Plan: Continue treatment on [facility]        "
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
## 2019-04-09 ENCOUNTER — TELEPHONE (OUTPATIENT)
Dept: ORTHOPEDICS | Facility: CLINIC | Age: 69
End: 2019-04-09

## 2019-04-09 DIAGNOSIS — S12.112A CLOSED NONDISPLACED ODONTOID FRACTURE WITH TYPE II MORPHOLOGY, INITIAL ENCOUNTER: Primary | ICD-10-CM

## 2019-04-09 LAB
POCT GLUCOSE: 113 MG/DL (ref 70–110)
POCT GLUCOSE: 97 MG/DL (ref 70–110)

## 2019-04-09 PROCEDURE — 25000003 PHARM REV CODE 250: Performed by: PSYCHIATRY & NEUROLOGY

## 2019-04-09 PROCEDURE — 90853 PR GROUP PSYCHOTHERAPY: ICD-10-PCS | Mod: ,,, | Performed by: PSYCHOLOGIST

## 2019-04-09 PROCEDURE — 99232 PR SUBSEQUENT HOSPITAL CARE,LEVL II: ICD-10-PCS | Mod: ,,, | Performed by: PSYCHIATRY & NEUROLOGY

## 2019-04-09 PROCEDURE — 99232 SBSQ HOSP IP/OBS MODERATE 35: CPT | Mod: ,,, | Performed by: PSYCHIATRY & NEUROLOGY

## 2019-04-09 PROCEDURE — 97150 GROUP THERAPEUTIC PROCEDURES: CPT

## 2019-04-09 PROCEDURE — 90833 PSYTX W PT W E/M 30 MIN: CPT | Mod: ,,, | Performed by: PSYCHIATRY & NEUROLOGY

## 2019-04-09 PROCEDURE — 97530 THERAPEUTIC ACTIVITIES: CPT

## 2019-04-09 PROCEDURE — 12400001 HC PSYCH SEMI-PRIVATE ROOM

## 2019-04-09 PROCEDURE — 90833 PR PSYCHOTHERAPY W/PATIENT W/E&M, 30 MIN (ADD ON): ICD-10-PCS | Mod: ,,, | Performed by: PSYCHIATRY & NEUROLOGY

## 2019-04-09 PROCEDURE — 97165 OT EVAL LOW COMPLEX 30 MIN: CPT

## 2019-04-09 PROCEDURE — 90853 GROUP PSYCHOTHERAPY: CPT | Mod: ,,, | Performed by: PSYCHOLOGIST

## 2019-04-09 RX ADMIN — AMLODIPINE BESYLATE 10 MG: 10 TABLET ORAL at 09:04

## 2019-04-09 RX ADMIN — SITAGLIPTIN 100 MG: 100 TABLET, FILM COATED ORAL at 09:04

## 2019-04-09 RX ADMIN — POLYETHYLENE GLYCOL 3350 17 G: 17 POWDER, FOR SOLUTION ORAL at 09:04

## 2019-04-09 RX ADMIN — METFORMIN HYDROCHLORIDE 1000 MG: 500 TABLET ORAL at 04:04

## 2019-04-09 RX ADMIN — CARVEDILOL 25 MG: 12.5 TABLET, FILM COATED ORAL at 09:04

## 2019-04-09 RX ADMIN — THERA TABS 1 TABLET: TAB at 09:04

## 2019-04-09 RX ADMIN — RISPERIDONE 1 MG: 1 TABLET ORAL at 09:04

## 2019-04-09 RX ADMIN — CARVEDILOL 25 MG: 12.5 TABLET, FILM COATED ORAL at 08:04

## 2019-04-09 RX ADMIN — DIVALPROEX SODIUM 250 MG: 250 TABLET, DELAYED RELEASE ORAL at 09:04

## 2019-04-09 RX ADMIN — QUINAPRIL 40 MG: 40 TABLET ORAL at 09:04

## 2019-04-09 RX ADMIN — METFORMIN HYDROCHLORIDE 1000 MG: 500 TABLET ORAL at 09:04

## 2019-04-09 RX ADMIN — DIVALPROEX SODIUM 250 MG: 250 TABLET, DELAYED RELEASE ORAL at 08:04

## 2019-04-09 RX ADMIN — ACETAMINOPHEN 650 MG: 325 TABLET ORAL at 11:04

## 2019-04-09 RX ADMIN — RISPERIDONE 3 MG: 1 TABLET ORAL at 08:04

## 2019-04-09 RX ADMIN — ATORVASTATIN CALCIUM 40 MG: 20 TABLET, FILM COATED ORAL at 09:04

## 2019-04-09 RX ADMIN — ACETAMINOPHEN 650 MG: 325 TABLET ORAL at 09:04

## 2019-04-09 NOTE — NURSING
Pt states that Dr. Franco manages his medication for depression. He has an appointment on 4/11. Will also schedule him with [facility].

## 2019-04-09 NOTE — PLAN OF CARE
Problem: Adult Behavioral Health Plan of Care  Goal: Adheres to Safety Considerations for Self and Others  Outcome: Ongoing (interventions implemented as appropriate)  Pt pleasant, calm, and cooperative.     Problem: Diabetes Comorbidity  Goal: Blood Glucose Level Within Desired Range  Outcome: Ongoing (interventions implemented as appropriate)  Pt compliant with diabetic diet and q shift acc-check.     Problem: Psychomotor Movement Impairment (Psychotic Signs/Symptoms)  Goal: Improved Psychomotor Symptoms (Psychotic Signs/Symptoms)  Outcome: Ongoing (interventions implemented as appropriate)  Pt has been under good behavioral control. He has not been agitated or hostile. He is linear and organized. Continues to report intermittent visual hallucinations involving an .

## 2019-04-09 NOTE — TELEPHONE ENCOUNTER
----- Message from SEVERIANO Tracy sent at 4/9/2019  9:15 AM CDT -----  Could you please have this patient see Dr. Almaraz in 4 weeks with cervical xrays? Orders in. This is hospital follow up for a C2 fracture after treatment with a brace.     Thanks. Lizett

## 2019-04-09 NOTE — DISCHARGE INSTRUCTIONS
Please follow ONLY the instructions that are checked below.    Activity Restrictions:  [x]  Return to work will be determined on an individual basis.  [x]  No lifting greater than 5-10 pounds.  [x]  Avoid bending and twisting the area of your surgery more than 45 degrees from neutral position in any direction.  [x]  No driving or operating machinery:  [x]  until cleared by your surgeon.  [x]  Wear your brace at all times. You may be given an extra brace or soft collar to wear when showering.  [x]  Walk on paved surfaces only. It is okay to walk up and down stairs while holding onto a side rail.      Discharge Medication/Follow-up:  [x]  Please refer to discharge medication reconciliation form.  [x]  Do not take ANY non-steroidal anti-inflammatory drugs (NSAIDS), including the following: ibuprofen, naprosyn, Aleve, Advil, Indocin, Mobic, or Celebrex until released by Neurosurgery.   [x]  Prescriptions for appropriate medication will be given upon discharge.  [x]  Take docusate (Colace 100 mg): take one capsule a day as needed for constipation. You can get this over the counter.  [x]  Follow-up appointment:  [x]  4 weeks with MD:  [x]  with x-rays  [x]  An appointment will be mailed to you.      Call your doctor or go to the Emergency Room for any signs of infection, including: increased redness, drainage, pain, or fever (temperature ?101.5 for 24 hours). Call your doctor or go to the Emergency Room if there are any localized neurological changes; problems with speech, vision, numbness, tingling, weakness, or severe headache; or for other concerns.    Special Instructions:  [x]  No use of tobacco products.      If you have any questions about this form, please call [phone].    Form No. 38961 (Revised 10/31/2013)

## 2019-04-09 NOTE — ASSESSMENT & PLAN NOTE
- Continue home Quinapril 40mg PO daily  - Continue home Carvedilol 25mg PO BID  - Continue home Amlodipine 10mg PO daily

## 2019-04-09 NOTE — PROGRESS NOTES
Group Psychotherapy (PhD/LCSW)    Site: [facility]    Clinical status of patient: Inpatient    Date: 4/9/2019    Group Focus: Life Skills    Length of service: 45849 - 35-40 minutes    Number of patients in attendance: 7    Referred by: Acute Psychiatry Unit Treatment Team    Target symptoms: Mood Disorder and Psychosis    Patient's response to treatment: Active Listening and Self-disclosure    Progress toward goals: Progressing slowly    Interval History: Pt appeared alert and attentive in group. Pt participated actively in a discussion of stress management strategies. Pt tended to ramble and become tangential in his responses. Pt needed limits and redirection on several occasions.     Diagnosis: Schizoaffective d/o    Plan: Continue treatment on [facility]

## 2019-04-09 NOTE — PROGRESS NOTES
04/09/19 0900 04/09/19 1100 04/09/19 1300   [facility] [[email or web address]] Group Therapy   Group Name Community Reintegration Education Therapeutic Recreation   Specific Interventions Current Events Guided Imagery/Relaxation Skilled Activity Crafts   Participation Level Active;Appropriate Active;Appropriate Active;Appropriate   Participation Quality Cooperative;Social Cooperative Cooperative;Social   Insight/Motivation Good Good Good   Affect/Mood Display Appropriate Appropriate Appropriate   Cognition Alert Alert Alert
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
## 2019-04-09 NOTE — PLAN OF CARE
04/08/19 1430   [facility] Group Therapy   Group Name Medication   Participation Level Appropriate   Participation Quality Cooperative   Insight/Motivation Improved   Affect/Mood Display Appropriate   Cognition Alert

## 2019-04-09 NOTE — ASSESSMENT & PLAN NOTE
69 year old male with a PMHx of BCC, depression, T2DM, GERD, HTN, and HLD, who originally presented to the [facility] ED due to altered mental status. While in the ED, he complained of acute onset of neck pain. CT cervical spine showed a nondisplaced type 2 dens fracture.      - Patient neurologically stable on exam  - Patient will need to wear his plastic C-collar at all times.  - Surgery on hold due to PEC/CEC, but will speak with surgery team as we feel patient is stable for surgery.    - Spoke to neurosurgery team who felt that they wanted to wait to see if injury heals on it's own. Neurosurgery will make arrangements for follow up Xrays and appointments in 4 weeks.
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
## 2019-04-09 NOTE — PROGRESS NOTES
04/08/19 2000   [facility] Group Therapy   Group Name Community Reintegration   Specific Interventions Coping Skills Training   Participation Level Appropriate   Participation Quality Cooperative   Insight/Motivation Good   Affect/Mood Display Appropriate   Cognition Alert
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
## 2019-04-09 NOTE — PROGRESS NOTES
04/09/19 1500   [facility] Group Therapy   Group Name Exercise   Specific Interventions Skilled Activity Mild Exercises   Participation Level None   Participation Quality Refused

## 2019-04-10 VITALS
HEART RATE: 63 BPM | SYSTOLIC BLOOD PRESSURE: 114 MMHG | TEMPERATURE: 98 F | RESPIRATION RATE: 18 BRPM | BODY MASS INDEX: 24.29 KG/M2 | WEIGHT: 164 LBS | DIASTOLIC BLOOD PRESSURE: 59 MMHG | HEIGHT: 69 IN

## 2019-04-10 LAB — POCT GLUCOSE: 128 MG/DL (ref 70–110)

## 2019-04-10 PROCEDURE — 25000003 PHARM REV CODE 250: Performed by: PSYCHIATRY & NEUROLOGY

## 2019-04-10 PROCEDURE — 99239 PR HOSPITAL DISCHARGE DAY,>30 MIN: ICD-10-PCS | Mod: ,,, | Performed by: PSYCHIATRY & NEUROLOGY

## 2019-04-10 PROCEDURE — 99239 HOSP IP/OBS DSCHRG MGMT >30: CPT | Mod: ,,, | Performed by: PSYCHIATRY & NEUROLOGY

## 2019-04-10 RX ORDER — RISPERIDONE 1 MG/1
1 TABLET ORAL DAILY
Qty: 30 TABLET | Refills: 0 | OUTPATIENT
Start: 2019-04-11 | End: 2019-05-11

## 2019-04-10 RX ORDER — RISPERIDONE 3 MG/1
3 TABLET ORAL NIGHTLY
Qty: 30 TABLET | Refills: 11 | OUTPATIENT
Start: 2019-04-10 | End: 2019-05-16

## 2019-04-10 RX ORDER — RISPERIDONE 1 MG/1
1 TABLET ORAL DAILY
Qty: 30 TABLET | Refills: 0 | Status: SHIPPED | OUTPATIENT
Start: 2019-04-11 | End: 2019-04-10

## 2019-04-10 RX ORDER — RISPERIDONE 3 MG/1
3 TABLET ORAL NIGHTLY
Qty: 30 TABLET | Refills: 11 | Status: SHIPPED | OUTPATIENT
Start: 2019-04-10 | End: 2019-04-10

## 2019-04-10 RX ADMIN — POLYETHYLENE GLYCOL 3350 17 G: 17 POWDER, FOR SOLUTION ORAL at 08:04

## 2019-04-10 RX ADMIN — DIVALPROEX SODIUM 250 MG: 250 TABLET, DELAYED RELEASE ORAL at 08:04

## 2019-04-10 RX ADMIN — METFORMIN HYDROCHLORIDE 1000 MG: 500 TABLET ORAL at 08:04

## 2019-04-10 RX ADMIN — AMLODIPINE BESYLATE 10 MG: 10 TABLET ORAL at 08:04

## 2019-04-10 RX ADMIN — CARVEDILOL 25 MG: 12.5 TABLET, FILM COATED ORAL at 08:04

## 2019-04-10 RX ADMIN — QUINAPRIL 40 MG: 40 TABLET ORAL at 08:04

## 2019-04-10 RX ADMIN — OXYCODONE HYDROCHLORIDE AND ACETAMINOPHEN 1 TABLET: 5; 325 TABLET ORAL at 05:04

## 2019-04-10 RX ADMIN — RISPERIDONE 1 MG: 1 TABLET ORAL at 08:04

## 2019-04-10 RX ADMIN — SITAGLIPTIN 100 MG: 100 TABLET, FILM COATED ORAL at 08:04

## 2019-04-10 RX ADMIN — THERA TABS 1 TABLET: TAB at 08:04

## 2019-04-10 RX ADMIN — ATORVASTATIN CALCIUM 40 MG: 20 TABLET, FILM COATED ORAL at 08:04

## 2019-04-10 NOTE — NURSING
Pt is given detailed D/C instructions on medications and follow up appointments. Pt is given signed MD scripts. Pt verbalizes understanding of all instructions. Pt is calm and compliant, no signs of distress noted or reported. Pt signs form verifying that he has received ALL of his belongings and valuables. Pt is escorted off unit to be discharged home with Sherrell venegas.

## 2019-04-10 NOTE — NURSING
Pt's up and out of bed with walker requesting something for a headache. Pt rated pain 7/10. Gave Tylenol 650 mg p.o for complaint of headache. Back to bed. Will continue to monitor.

## 2019-04-10 NOTE — ASSESSMENT & PLAN NOTE
69 year old male with a PMHx of BCC, depression, T2DM, GERD, HTN, and HLD, who originally presented to the [facility] ED due to altered mental status. While in the ED, he complained of acute onset of neck pain. CT cervical spine showed a nondisplaced type 2 dens fracture.      - Patient neurologically stable on exam  - Patient will need to wear his plastic C-collar at all times.  - Surgery held due to PEC/CEC    - Spoke to neurosurgery team who felt that they wanted to wait to see if injury heals on it's own. Neurosurgery arranged for follow up Xrays and appointments on 5/7.
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
## 2019-04-10 NOTE — PLAN OF CARE
Problem: Adult Behavioral Health Plan of Care  Goal: Plan of Care Review  Outcome: Ongoing (interventions implemented as appropriate)  Observed awake and alert ambulating unit with walker. C-collar in place. Denied SI/HI, /AV hallucinations. Pleasant upon approach.Interacting appropriately with peers. No agitation, psychotic, or hostile behavior noted. Took scheduled medications without any problems. Awake after 2330 requesting pain medications for a headache. Gave Tylenol 650 mg p.o for headache rated 7/10 ( see MAR ). Appeared asleep throughout the night as noted during frequent rounds. Free from falls/injury. Safety maintained. Continue to monitor.    Problem: Diabetes Comorbidity  Goal: Blood Glucose Level Within Desired Range  Outcome: Ongoing (interventions implemented as appropriate)  Blood glucose 113. Pt educated on the importance of frequent glucose monitoring and adhering to a diabetic diet. Pt verbalized understanding. Continue to monitor.
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
## 2019-04-11 PROBLEM — J18.9 PNEUMONIA DUE TO INFECTIOUS ORGANISM: Status: RESOLVED | Noted: 2019-04-02 | Resolved: 2019-04-11

## 2019-04-11 PROBLEM — R41.82 ALTERED MENTAL STATUS: Status: RESOLVED | Noted: 2019-04-01 | Resolved: 2019-04-11

## 2019-04-11 PROBLEM — T43.014A: Status: ACTIVE | Noted: 2019-04-11

## 2019-04-11 PROBLEM — S12.112D CLOSED NONDISPLACED ODONTOID FRACTURE WITH TYPE II MORPHOLOGY AND ROUTINE HEALING: Status: ACTIVE | Noted: 2019-04-02

## 2019-05-12 ENCOUNTER — NURSE TRIAGE (OUTPATIENT)
Dept: ADMINISTRATIVE | Facility: CLINIC | Age: 69
End: 2019-05-12

## 2019-05-12 NOTE — TELEPHONE ENCOUNTER
Reason for Disposition   [1] Can't walk or can barely walk AND [2] new onset    Protocols used: LEG SWELLING AND EDEMA-A-  ED 5/6,5/11  Pt edema of both legs. Legs edema again. no SOB. No CP. c/o HA- pt with htn. rec ED. Pt states that he will probably go over to ED when it cools down later. call back with questions.

## 2019-05-13 NOTE — TELEPHONE ENCOUNTER
Spoke to pt. He states no better no worse. He said he is outside working on a  to make some money to buy food for the chickens. Asked him if he wanted an appt with PCP and he requested Thursday appt. Appt scheduled. Pt vu well.

## 2019-05-16 PROBLEM — N40.1 BENIGN PROSTATIC HYPERPLASIA WITH INCOMPLETE BLADDER EMPTYING: Status: ACTIVE | Noted: 2019-05-16

## 2019-05-16 PROBLEM — R00.2 PALPITATIONS: Status: ACTIVE | Noted: 2019-05-16

## 2019-05-16 PROBLEM — R51.9 FREQUENT HEADACHES: Status: ACTIVE | Noted: 2019-05-16

## 2019-05-16 PROBLEM — M62.838 MUSCLE SPASM: Status: ACTIVE | Noted: 2019-05-16

## 2019-05-16 PROBLEM — R39.14 BENIGN PROSTATIC HYPERPLASIA WITH INCOMPLETE BLADDER EMPTYING: Status: ACTIVE | Noted: 2019-05-16

## 2019-07-01 PROBLEM — Z09 S/P ORTHOPEDIC SURGERY, FOLLOW-UP EXAM: Status: ACTIVE | Noted: 2019-07-01

## 2019-07-02 PROBLEM — F29 PSYCHOSIS: Status: ACTIVE | Noted: 2019-07-02

## 2019-07-08 PROBLEM — R63.4 WEIGHT LOSS, UNINTENTIONAL: Status: ACTIVE | Noted: 2019-07-08

## 2019-08-20 ENCOUNTER — PATIENT OUTREACH (OUTPATIENT)
Dept: ADMINISTRATIVE | Facility: HOSPITAL | Age: 69
End: 2019-08-20

## 2019-09-30 PROBLEM — Z09 S/P ORTHOPEDIC SURGERY, FOLLOW-UP EXAM: Status: RESOLVED | Noted: 2019-07-01 | Resolved: 2019-09-30

## 2020-09-08 PROBLEM — F19.959 SUBSTANCE-INDUCED PSYCHOTIC DISORDER: Status: RESOLVED | Noted: 2019-04-05 | Resolved: 2020-09-08

## 2020-09-08 PROBLEM — E53.8 B12 DEFICIENCY: Status: ACTIVE | Noted: 2020-09-08

## 2020-09-08 PROBLEM — D64.9 ABSOLUTE ANEMIA: Status: ACTIVE | Noted: 2020-09-08

## 2021-05-06 ENCOUNTER — PATIENT MESSAGE (OUTPATIENT)
Dept: RESEARCH | Facility: HOSPITAL | Age: 71
End: 2021-05-06

## 2021-07-01 ENCOUNTER — PATIENT MESSAGE (OUTPATIENT)
Dept: ADMINISTRATIVE | Facility: OTHER | Age: 71
End: 2021-07-01

## 2022-05-17 PROBLEM — E03.4 HYPOTHYROIDISM DUE TO ACQUIRED ATROPHY OF THYROID: Status: ACTIVE | Noted: 2017-09-11

## 2022-05-17 PROBLEM — G56.03 BILATERAL CARPAL TUNNEL SYNDROME: Status: ACTIVE | Noted: 2022-05-17

## 2022-07-11 ENCOUNTER — PATIENT MESSAGE (OUTPATIENT)
Dept: ADMINISTRATIVE | Facility: HOSPITAL | Age: 72
End: 2022-07-11
Payer: MEDICAID

## 2022-09-20 PROBLEM — I77.810 ASCENDING AORTA DILATATION: Status: ACTIVE | Noted: 2022-09-20

## 2022-09-20 PROBLEM — C91.10 CLL (CHRONIC LYMPHOCYTIC LEUKEMIA): Status: ACTIVE | Noted: 2022-09-20

## 2022-09-20 PROBLEM — Q93.89: Status: ACTIVE | Noted: 2022-09-20

## 2023-11-15 DIAGNOSIS — E11.9 TYPE 2 DIABETES MELLITUS WITHOUT COMPLICATION: ICD-10-CM

## 2024-03-28 PROBLEM — R97.20 ELEVATED PSA: Status: ACTIVE | Noted: 2024-03-28
